# Patient Record
Sex: MALE | Race: WHITE | NOT HISPANIC OR LATINO | Employment: OTHER | URBAN - METROPOLITAN AREA
[De-identification: names, ages, dates, MRNs, and addresses within clinical notes are randomized per-mention and may not be internally consistent; named-entity substitution may affect disease eponyms.]

---

## 2023-04-30 DIAGNOSIS — K21.9 GASTROESOPHAGEAL REFLUX DISEASE WITHOUT ESOPHAGITIS: Primary | ICD-10-CM

## 2023-05-01 RX ORDER — PANTOPRAZOLE SODIUM 40 MG/1
TABLET, DELAYED RELEASE ORAL
Qty: 90 TABLET | Refills: 0 | Status: SHIPPED | OUTPATIENT
Start: 2023-05-01 | End: 2023-08-09

## 2023-08-09 DIAGNOSIS — K21.9 GASTROESOPHAGEAL REFLUX DISEASE WITHOUT ESOPHAGITIS: ICD-10-CM

## 2023-08-09 DIAGNOSIS — N52.9 ERECTILE DYSFUNCTION, UNSPECIFIED ERECTILE DYSFUNCTION TYPE: ICD-10-CM

## 2023-08-09 RX ORDER — SILDENAFIL 100 MG/1
TABLET, FILM COATED ORAL
Qty: 8 TABLET | Refills: 2 | Status: SHIPPED | OUTPATIENT
Start: 2023-08-09 | End: 2024-01-25

## 2023-08-09 RX ORDER — PANTOPRAZOLE SODIUM 40 MG/1
TABLET, DELAYED RELEASE ORAL
Qty: 90 TABLET | Refills: 1 | Status: SHIPPED | OUTPATIENT
Start: 2023-08-09 | End: 2024-02-29 | Stop reason: SDUPTHER

## 2023-10-06 ENCOUNTER — OFFICE VISIT (OUTPATIENT)
Dept: PRIMARY CARE | Facility: CLINIC | Age: 73
End: 2023-10-06
Payer: MEDICARE

## 2023-10-06 VITALS
BODY MASS INDEX: 27.3 KG/M2 | TEMPERATURE: 98.3 F | SYSTOLIC BLOOD PRESSURE: 122 MMHG | WEIGHT: 201.6 LBS | HEIGHT: 72 IN | OXYGEN SATURATION: 96 % | RESPIRATION RATE: 16 BRPM | HEART RATE: 66 BPM | DIASTOLIC BLOOD PRESSURE: 80 MMHG

## 2023-10-06 DIAGNOSIS — L03.311 CELLULITIS OF ABDOMINAL WALL: Primary | ICD-10-CM

## 2023-10-06 PROCEDURE — 96372 THER/PROPH/DIAG INJ SC/IM: CPT | Performed by: FAMILY MEDICINE

## 2023-10-06 PROCEDURE — 99213 OFFICE O/P EST LOW 20 MIN: CPT | Performed by: FAMILY MEDICINE

## 2023-10-06 PROCEDURE — 1159F MED LIST DOCD IN RCRD: CPT | Performed by: FAMILY MEDICINE

## 2023-10-06 RX ORDER — PROPAFENONE HYDROCHLORIDE 225 MG/1
225 TABLET, COATED ORAL EVERY 8 HOURS
COMMUNITY
End: 2024-01-23 | Stop reason: ALTCHOICE

## 2023-10-06 RX ORDER — LANOLIN ALCOHOL/MO/W.PET/CERES
1000 CREAM (GRAM) TOPICAL DAILY
COMMUNITY

## 2023-10-06 RX ORDER — DABIGATRAN ETEXILATE 150 MG/1
150 CAPSULE ORAL 2 TIMES DAILY
COMMUNITY
Start: 2019-03-13 | End: 2024-05-20 | Stop reason: SDUPTHER

## 2023-10-06 RX ORDER — SULFAMETHOXAZOLE AND TRIMETHOPRIM 800; 160 MG/1; MG/1
1 TABLET ORAL 2 TIMES DAILY
Qty: 20 TABLET | Refills: 1 | Status: SHIPPED | OUTPATIENT
Start: 2023-10-06 | End: 2023-10-16

## 2023-10-06 RX ORDER — FUROSEMIDE 20 MG/1
20 TABLET ORAL 3 TIMES WEEKLY
COMMUNITY
Start: 2019-09-04

## 2023-10-06 RX ORDER — METOPROLOL SUCCINATE 25 MG/1
50 TABLET, EXTENDED RELEASE ORAL NIGHTLY
COMMUNITY
Start: 2019-09-05 | End: 2024-01-23 | Stop reason: DRUGHIGH

## 2023-10-06 RX ORDER — LISINOPRIL 10 MG/1
10 TABLET ORAL DAILY
COMMUNITY
Start: 2019-02-05 | End: 2024-02-29 | Stop reason: SDUPTHER

## 2023-10-06 RX ORDER — CEFTRIAXONE 1 G/1
1 INJECTION, POWDER, FOR SOLUTION INTRAMUSCULAR; INTRAVENOUS ONCE
Status: COMPLETED | OUTPATIENT
Start: 2023-10-06 | End: 2023-10-06

## 2023-10-06 RX ORDER — CEFTRIAXONE 1 G/1
1 INJECTION, POWDER, FOR SOLUTION INTRAMUSCULAR; INTRAVENOUS ONCE
Status: DISCONTINUED | OUTPATIENT
Start: 2023-10-06 | End: 2023-10-06

## 2023-10-06 RX ORDER — POTASSIUM CHLORIDE 20 MEQ/1
20 TABLET, EXTENDED RELEASE ORAL DAILY
COMMUNITY
End: 2024-01-23 | Stop reason: ALTCHOICE

## 2023-10-06 RX ADMIN — CEFTRIAXONE 1 G: 1 INJECTION, POWDER, FOR SOLUTION INTRAMUSCULAR; INTRAVENOUS at 10:38

## 2023-10-06 NOTE — PROGRESS NOTES
Subjective   Patient ID: Cristiano Silva is a 73 y.o. male who presents for Fever and Mass.  HPI    Pt is being seen for fever   Ongoing for last 2 day  Pt states tested negative for Covid  Pt states that he temp has been in 100  Pt is not taking any OTC medication for his fever    Pt also has lump in his groin area  Pt states that lump is located on the right side   Pt states that it is very sore and tender  Pt states that the lump pop up 2 days ago      No other concern    Review of Systems  Constitutional:  no chills, no fever and no night sweats.  Eyes: no blurred vision and no eyesight problems.  ENT: no hearing loss, no nasal congestion, no hoarseness and no sore throat.  Neck: no mass (es) and no swelling.  Cardiovascular: no chest pain, no intermittent leg claudication, no lower extremity edema, no palpitation and no syncope.  Respiratory: no cough, no shortness of breath during exertion, no shortness of breath at rest and no wheezing.  Gastrointestinal: no abdominal pain, no blood in stools, no constipation, no diarrhea, no melena, no nausea, no rectal pain and no vomiting.  Genitourinary: no dysuria, no change in urinary frequency, no urinary hesitancy and no feelings of urinary urgency.  Musculoskeletal: no arthralgias, no back pain and no myalgias.  Integumentary: no new skin lesions and no rashes.  Neurological: no difficulty walking, no headache, no limb weakness, no numbness and no tingling.  Psychiatric/Behavioral: no anxiety, no depression, no anhedonia and no substance use disorders.  Endocrine: no recent weight gain and no recent weight loss.  Hematologic/Lymphatic: no tendency for easy bruising and no swollen glands    Objective   Physical Exam  Patient in with complaints of pain right lower abdominal groin area fever patient states he had a pimple he squeezed on swollen and inflamed exam shows indurated red nonfluctuant area with an eschar to the surface of the skin minimal inguinal adenopathy on  the right side.  No abdominal pain currently no fever.  Physical exam today's office visit constitutional alert and oriented x3.    Head is atraumatic HEENT is within normal limits.    Neck supple no masses full range of motion.    Thyroid is normal in size no thyromegaly there is no carotid bruits.    Pulmonary exam shows clear to auscultation no respiratory distress.    Cardiovascular shows no murmur rub or gallop.  Regular rate and rhythm.    Abdominal exam soft nontender no hepatosplenomegaly or masses normal bowel sounds no rebound no guarding.    Musculoskeletal exam no joint pain no muscle pain full range of motion.    Psych exam normal mood and affect.    Dermatologic exam no skin lesions no rash no blemishes.    Neuro exam is no focal deficits.  Normal exam.    Extremities no edema normal pulses normal capillary refill.    There were no vitals taken for this visit.    Lab Results   Component Value Date    WBC 7.4 12/15/2022    HGB 16.2 12/15/2022    HCT 49.7 12/15/2022    MCV 96 12/15/2022     12/15/2022       Assessment/Plan plan is to give him a gram Rocephin and start Bactrim DS twice daily for 10 days have him follow-up next week wanted to see him in 3 days he says he could not come back he is got a camping area that he has to close to and a couple golf cart advised that if he is worsening he needs to go to the emergency room otherwise follow-up with us either his PCP or myself in a week.  If this is worsening he is to let us know also recommend sitz bath's.  He is not to poke or squeeze at this area is advised it may come to ahead and drain we will reevaluate that when he comes in.  Problem List Items Addressed This Visit    None

## 2023-11-10 PROBLEM — I50.20 HFREF (HEART FAILURE WITH REDUCED EJECTION FRACTION) (MULTI): Status: ACTIVE | Noted: 2021-08-30

## 2023-11-10 PROBLEM — I48.91 ATRIAL FIBRILLATION (MULTI): Status: ACTIVE | Noted: 2023-11-10

## 2023-11-10 PROBLEM — R39.12 BENIGN PROSTATIC HYPERPLASIA WITH WEAK URINARY STREAM: Status: ACTIVE | Noted: 2023-11-10

## 2023-11-10 PROBLEM — F17.200 NICOTINE DEPENDENCE: Status: ACTIVE | Noted: 2021-08-30

## 2023-11-10 PROBLEM — R97.20 HIGH PROSTATE SPECIFIC ANTIGEN (PSA): Status: ACTIVE | Noted: 2018-03-27

## 2023-11-10 PROBLEM — E55.9 VITAMIN D DEFICIENCY: Status: ACTIVE | Noted: 2023-11-10

## 2023-11-10 PROBLEM — N32.89 BLADDER WALL THICKENING: Status: ACTIVE | Noted: 2023-11-10

## 2023-11-10 PROBLEM — Z86.79: Status: ACTIVE | Noted: 2023-11-10

## 2023-11-10 PROBLEM — I11.9 HYPERTENSIVE HEART DISEASE: Status: ACTIVE | Noted: 2023-11-10

## 2023-11-10 PROBLEM — N40.1 BENIGN PROSTATIC HYPERPLASIA WITH WEAK URINARY STREAM: Status: ACTIVE | Noted: 2023-11-10

## 2023-11-10 PROBLEM — R73.03 PREDIABETES: Status: ACTIVE | Noted: 2021-05-20

## 2023-11-10 PROBLEM — M19.019 PRIMARY LOCALIZED OSTEOARTHROSIS OF SHOULDER REGION: Status: ACTIVE | Noted: 2020-02-17

## 2023-11-10 PROBLEM — R13.10 DYSPHAGIA: Status: ACTIVE | Noted: 2022-04-13

## 2023-11-10 PROBLEM — I50.42 CHRONIC COMBINED SYSTOLIC AND DIASTOLIC CHF (CONGESTIVE HEART FAILURE) (MULTI): Status: ACTIVE | Noted: 2023-11-10

## 2023-11-10 PROBLEM — I10 ESSENTIAL HYPERTENSION: Status: ACTIVE | Noted: 2017-04-12

## 2023-11-10 PROBLEM — R53.83 FATIGUE: Status: ACTIVE | Noted: 2023-11-10

## 2023-11-10 RX ORDER — CHOLECALCIFEROL (VITAMIN D3) 50 MCG
1 TABLET ORAL DAILY
COMMUNITY
End: 2024-05-20 | Stop reason: ALTCHOICE

## 2023-11-10 NOTE — PROGRESS NOTES
Subjective   Patient ID: Cristiano Silva is a 73 y.o. male who presents for Hypertension.  HPI    HTN follow up  Denies chest pain,SOB, swelling, headaches, lightheadedness or dizziness.   Eats a generally healthy diet, Exercises.   Does not check BP at home.   Currently taking lisinopril and metoprolol     Taking current medications which were reviewed.  Problem list discussed.    Overall doing well.  Eating okay.  Staying active.    Has no other new problem /question.     ROS  Constitutional- No activity change. No appetite change.  Eyes- Denies vision changes.  Respiratory- No shortness of breath.  Cardiovascular- No palpitations. No chest pain.  GI- No nausea or vomiting. No diarrhea or constipation. Denies abdominal pain.  Musculoskeletal- Denies joint swelling.  Extremities- No edema.  Neurological- Denies headaches. Denies dizziness.  Skin- No rashes.  Psychiatric/Behavioral- Denies significant anxiety, or depressed mood.     Objective     /86   Pulse 98   Temp 36.5 °C (97.7 °F) (Temporal)   Resp 18   Ht 1.829 m (6')   Wt 93 kg (205 lb)   SpO2 98%   BMI 27.80 kg/m²     Allergies   Allergen Reactions    Statins-Hmg-Coa Reductase Inhibitors Other and Myalgia       Constitutional-- Well-nourished.  No distress  Head- unremarkable.  Eyes- PERRL.  Conjunctiva normal.  Nose- Normal.  No rhinorrhea noted.  Throat- Oropharynx is clear and moist.  Neck- Supple with no thyromegaly.  No significant cervical adenopathy noted.  Pulmonary/Chest- Breath sounds normal with normal effort.  No wheezing.  Heart- Regular rate and rhythm.  No murmur.  Abdomen- Soft and non-tender.  No masses noted.  Musculoskeletal- Normal ROM.  No significant joint swelling  Extremities- No edema.   Neurological- Alert.  No noted deficits.  Skin- Warm.  No rashes.  Psychiatric/Behavioral- Mood and affect normal.  Behavior normal.     Assessment/Plan   1. Hyperlipidemia, unspecified hyperlipidemia type  Lipid Panel      2. Essential  hypertension  Comprehensive Metabolic Panel    CBC and Auto Differential      3. Vitamin D deficiency        4. Gastroesophageal reflux disease without esophagitis        5. Primary localized osteoarthrosis of shoulder region, unspecified laterality        6. Colon cancer screening  Colonoscopy Screening; Average Risk Patient      7. Screening PSA (prostate specific antigen)  Prostate Specific Antigen, Screen      8. Atherosclerosis of aorta (CMS/HCC)        9. Centrilobular emphysema (CMS/HCC)        10. HFrEF (heart failure with reduced ejection fraction) (CMS/HCC)        11. Longstanding persistent atrial fibrillation (CMS/HCC)        12. Sarcoma (CMS/HCC)        13. Benign prostatic hyperplasia with urinary hesitancy  tamsulosin (Flomax) 0.4 mg 24 hr capsule             Long talk. Treatment options reviewed.    Reviewed most recent labs with patient.  Advised patient to remain up to date on routine screening and health maintenance.      Hypertension controlled.     Educated on acid reflux, heart burn and prevention.  Controlled  BPH symptoms persist.  Start Flomax again.  Recommend urology follow-up as he is seen in the past  Sarcoma stable.  Continue to follow-up with your specialist      Continue and take your medications as prescribed.    Health Maintenance issues discussed.    Importance of healthy diet and regular exercise regimen discussed.    We will contact you with any test results ordered. If you do not hear from us, please contact.    Follow-up as instructed or sooner if any problems or symptoms do not resolve as expected.    Scribe Attestation  By signing my name below, Kyara ROSSI Scribe   attest that this documentation has been prepared under the direction and in the presence of Tez Bates MD.

## 2023-11-13 ENCOUNTER — OFFICE VISIT (OUTPATIENT)
Dept: PRIMARY CARE | Facility: CLINIC | Age: 73
End: 2023-11-13
Payer: MEDICARE

## 2023-11-13 VITALS
WEIGHT: 205 LBS | TEMPERATURE: 97.7 F | SYSTOLIC BLOOD PRESSURE: 130 MMHG | HEIGHT: 72 IN | OXYGEN SATURATION: 98 % | BODY MASS INDEX: 27.77 KG/M2 | DIASTOLIC BLOOD PRESSURE: 86 MMHG | RESPIRATION RATE: 18 BRPM | HEART RATE: 98 BPM

## 2023-11-13 DIAGNOSIS — C49.9 SARCOMA (MULTI): ICD-10-CM

## 2023-11-13 DIAGNOSIS — I48.11 LONGSTANDING PERSISTENT ATRIAL FIBRILLATION (MULTI): ICD-10-CM

## 2023-11-13 DIAGNOSIS — N40.1 BENIGN PROSTATIC HYPERPLASIA WITH URINARY HESITANCY: ICD-10-CM

## 2023-11-13 DIAGNOSIS — E55.9 VITAMIN D DEFICIENCY: ICD-10-CM

## 2023-11-13 DIAGNOSIS — I10 ESSENTIAL HYPERTENSION: ICD-10-CM

## 2023-11-13 DIAGNOSIS — I70.0 ATHEROSCLEROSIS OF AORTA (CMS-HCC): ICD-10-CM

## 2023-11-13 DIAGNOSIS — E78.5 HYPERLIPIDEMIA, UNSPECIFIED HYPERLIPIDEMIA TYPE: Primary | ICD-10-CM

## 2023-11-13 DIAGNOSIS — Z12.11 COLON CANCER SCREENING: ICD-10-CM

## 2023-11-13 DIAGNOSIS — Z12.5 SCREENING PSA (PROSTATE SPECIFIC ANTIGEN): ICD-10-CM

## 2023-11-13 DIAGNOSIS — K21.9 GASTROESOPHAGEAL REFLUX DISEASE WITHOUT ESOPHAGITIS: ICD-10-CM

## 2023-11-13 DIAGNOSIS — J43.2 CENTRILOBULAR EMPHYSEMA (MULTI): ICD-10-CM

## 2023-11-13 DIAGNOSIS — M19.019 PRIMARY LOCALIZED OSTEOARTHROSIS OF SHOULDER REGION, UNSPECIFIED LATERALITY: ICD-10-CM

## 2023-11-13 DIAGNOSIS — I50.20 HFREF (HEART FAILURE WITH REDUCED EJECTION FRACTION) (MULTI): ICD-10-CM

## 2023-11-13 DIAGNOSIS — R39.11 BENIGN PROSTATIC HYPERPLASIA WITH URINARY HESITANCY: ICD-10-CM

## 2023-11-13 PROCEDURE — 3079F DIAST BP 80-89 MM HG: CPT | Performed by: FAMILY MEDICINE

## 2023-11-13 PROCEDURE — 99214 OFFICE O/P EST MOD 30 MIN: CPT | Performed by: FAMILY MEDICINE

## 2023-11-13 PROCEDURE — 3075F SYST BP GE 130 - 139MM HG: CPT | Performed by: FAMILY MEDICINE

## 2023-11-13 PROCEDURE — 1159F MED LIST DOCD IN RCRD: CPT | Performed by: FAMILY MEDICINE

## 2023-11-13 PROCEDURE — 1160F RVW MEDS BY RX/DR IN RCRD: CPT | Performed by: FAMILY MEDICINE

## 2023-11-13 RX ORDER — TAMSULOSIN HYDROCHLORIDE 0.4 MG/1
0.4 CAPSULE ORAL DAILY
Qty: 30 CAPSULE | Refills: 3 | Status: SHIPPED | OUTPATIENT
Start: 2023-11-13 | End: 2024-01-23 | Stop reason: ALTCHOICE

## 2023-11-13 ASSESSMENT — PATIENT HEALTH QUESTIONNAIRE - PHQ9
2. FEELING DOWN, DEPRESSED OR HOPELESS: NOT AT ALL
SUM OF ALL RESPONSES TO PHQ9 QUESTIONS 1 AND 2: 0
1. LITTLE INTEREST OR PLEASURE IN DOING THINGS: NOT AT ALL

## 2023-11-20 ENCOUNTER — LAB (OUTPATIENT)
Dept: LAB | Facility: LAB | Age: 73
End: 2023-11-20
Payer: MEDICARE

## 2023-11-20 DIAGNOSIS — Z12.5 SCREENING PSA (PROSTATE SPECIFIC ANTIGEN): ICD-10-CM

## 2023-11-20 DIAGNOSIS — E78.5 HYPERLIPIDEMIA, UNSPECIFIED HYPERLIPIDEMIA TYPE: ICD-10-CM

## 2023-11-20 DIAGNOSIS — I10 ESSENTIAL HYPERTENSION: ICD-10-CM

## 2023-11-20 LAB
ALBUMIN SERPL BCP-MCNC: 4.1 G/DL (ref 3.4–5)
ALP SERPL-CCNC: 39 U/L (ref 33–136)
ALT SERPL W P-5'-P-CCNC: 12 U/L (ref 10–52)
ANION GAP SERPL CALC-SCNC: 11 MMOL/L (ref 10–20)
AST SERPL W P-5'-P-CCNC: 15 U/L (ref 9–39)
BASOPHILS # BLD AUTO: 0.1 X10*3/UL (ref 0–0.1)
BASOPHILS NFR BLD AUTO: 1.3 %
BILIRUB SERPL-MCNC: 0.8 MG/DL (ref 0–1.2)
BUN SERPL-MCNC: 18 MG/DL (ref 6–23)
CALCIUM SERPL-MCNC: 9 MG/DL (ref 8.6–10.3)
CHLORIDE SERPL-SCNC: 102 MMOL/L (ref 98–107)
CHOLEST SERPL-MCNC: 177 MG/DL (ref 0–199)
CHOLESTEROL/HDL RATIO: 4.8
CO2 SERPL-SCNC: 29 MMOL/L (ref 21–32)
CREAT SERPL-MCNC: 1.43 MG/DL (ref 0.5–1.3)
EOSINOPHIL # BLD AUTO: 0.2 X10*3/UL (ref 0–0.4)
EOSINOPHIL NFR BLD AUTO: 2.6 %
ERYTHROCYTE [DISTWIDTH] IN BLOOD BY AUTOMATED COUNT: 12.4 % (ref 11.5–14.5)
GFR SERPL CREATININE-BSD FRML MDRD: 52 ML/MIN/1.73M*2
GLUCOSE SERPL-MCNC: 92 MG/DL (ref 74–99)
HCT VFR BLD AUTO: 48.2 % (ref 41–52)
HDLC SERPL-MCNC: 37.1 MG/DL
HGB BLD-MCNC: 15.9 G/DL (ref 13.5–17.5)
IMM GRANULOCYTES # BLD AUTO: 0.04 X10*3/UL (ref 0–0.5)
IMM GRANULOCYTES NFR BLD AUTO: 0.5 % (ref 0–0.9)
LDLC SERPL CALC-MCNC: 123 MG/DL
LYMPHOCYTES # BLD AUTO: 3.16 X10*3/UL (ref 0.8–3)
LYMPHOCYTES NFR BLD AUTO: 41.3 %
MCH RBC QN AUTO: 30.9 PG (ref 26–34)
MCHC RBC AUTO-ENTMCNC: 33 G/DL (ref 32–36)
MCV RBC AUTO: 94 FL (ref 80–100)
MONOCYTES # BLD AUTO: 0.5 X10*3/UL (ref 0.05–0.8)
MONOCYTES NFR BLD AUTO: 6.5 %
NEUTROPHILS # BLD AUTO: 3.65 X10*3/UL (ref 1.6–5.5)
NEUTROPHILS NFR BLD AUTO: 47.8 %
NON HDL CHOLESTEROL: 140 MG/DL (ref 0–149)
NRBC BLD-RTO: 0 /100 WBCS (ref 0–0)
PLATELET # BLD AUTO: 202 X10*3/UL (ref 150–450)
POTASSIUM SERPL-SCNC: 4.7 MMOL/L (ref 3.5–5.3)
PROT SERPL-MCNC: 7 G/DL (ref 6.4–8.2)
PSA SERPL-MCNC: 5.39 NG/ML
RBC # BLD AUTO: 5.14 X10*6/UL (ref 4.5–5.9)
SODIUM SERPL-SCNC: 137 MMOL/L (ref 136–145)
TRIGL SERPL-MCNC: 83 MG/DL (ref 0–149)
VLDL: 17 MG/DL (ref 0–40)
WBC # BLD AUTO: 7.7 X10*3/UL (ref 4.4–11.3)

## 2023-11-20 PROCEDURE — G0103 PSA SCREENING: HCPCS

## 2023-11-20 PROCEDURE — 36415 COLL VENOUS BLD VENIPUNCTURE: CPT

## 2023-11-20 PROCEDURE — 85025 COMPLETE CBC W/AUTO DIFF WBC: CPT

## 2023-11-20 PROCEDURE — 80053 COMPREHEN METABOLIC PANEL: CPT

## 2023-11-20 PROCEDURE — 80061 LIPID PANEL: CPT

## 2023-12-15 ENCOUNTER — TELEPHONE (OUTPATIENT)
Dept: PRIMARY CARE | Facility: CLINIC | Age: 73
End: 2023-12-15
Payer: MEDICARE

## 2023-12-15 NOTE — TELEPHONE ENCOUNTER
Patient is calling because he has a colonoscopy order from you but he isn't able to get a ride. He wanted to know if he could have the cologuard done instead. Ok for cologuard order? He states he's not having any issues so he thinks that the cologuard would be ok?  Please advise  Thanks      Patient's # [phone]

## 2023-12-15 NOTE — TELEPHONE ENCOUNTER
Pt aware. States he may be able to find a ride in January. He will call to set this up and give us a call back with an update.

## 2023-12-26 ENCOUNTER — TELEPHONE (OUTPATIENT)
Dept: GASTROENTEROLOGY | Facility: CLINIC | Age: 73
End: 2023-12-26
Payer: MEDICARE

## 2023-12-26 NOTE — TELEPHONE ENCOUNTER
Spoke with pt who tested positive for Covid 12/23. Was calling pt about cardiac clearance which was not dicussed. Due to Covid procedure is canceled and he will call back due to transportation.

## 2023-12-28 ENCOUNTER — TELEPHONE (OUTPATIENT)
Dept: GASTROENTEROLOGY | Facility: CLINIC | Age: 73
End: 2023-12-28
Payer: MEDICARE

## 2023-12-28 NOTE — TELEPHONE ENCOUNTER
Spoke with pt about rescheduling coloscopy due to him being covid postive. He will reach out when he speaks with family about having a ride to procedure.

## 2024-01-09 DIAGNOSIS — Z12.11 COLON CANCER SCREENING: ICD-10-CM

## 2024-01-09 RX ORDER — SODIUM, POTASSIUM,MAG SULFATES 17.5-3.13G
SOLUTION, RECONSTITUTED, ORAL ORAL
Qty: 2 EACH | Refills: 0 | Status: SHIPPED | OUTPATIENT
Start: 2024-01-09 | End: 2024-05-20 | Stop reason: ALTCHOICE

## 2024-01-10 ENCOUNTER — APPOINTMENT (OUTPATIENT)
Dept: GASTROENTEROLOGY | Facility: HOSPITAL | Age: 74
End: 2024-01-10
Payer: MEDICARE

## 2024-01-23 ENCOUNTER — ANESTHESIA (OUTPATIENT)
Dept: GASTROENTEROLOGY | Facility: HOSPITAL | Age: 74
End: 2024-01-23
Payer: MEDICARE

## 2024-01-23 ENCOUNTER — ANESTHESIA EVENT (OUTPATIENT)
Dept: GASTROENTEROLOGY | Facility: HOSPITAL | Age: 74
End: 2024-01-23
Payer: MEDICARE

## 2024-01-23 ENCOUNTER — HOSPITAL ENCOUNTER (OUTPATIENT)
Dept: GASTROENTEROLOGY | Facility: HOSPITAL | Age: 74
Setting detail: OUTPATIENT SURGERY
Discharge: HOME | End: 2024-01-23
Payer: MEDICARE

## 2024-01-23 VITALS
SYSTOLIC BLOOD PRESSURE: 142 MMHG | DIASTOLIC BLOOD PRESSURE: 68 MMHG | WEIGHT: 203 LBS | TEMPERATURE: 97.7 F | BODY MASS INDEX: 27.5 KG/M2 | OXYGEN SATURATION: 98 % | HEIGHT: 72 IN | RESPIRATION RATE: 16 BRPM | HEART RATE: 86 BPM

## 2024-01-23 DIAGNOSIS — Z12.11 COLON CANCER SCREENING: Primary | ICD-10-CM

## 2024-01-23 PROCEDURE — 88305 TISSUE EXAM BY PATHOLOGIST: CPT | Mod: TC,SUR,STJLAB | Performed by: INTERNAL MEDICINE

## 2024-01-23 PROCEDURE — 7100000009 HC PHASE TWO TIME - INITIAL BASE CHARGE

## 2024-01-23 PROCEDURE — 2500000004 HC RX 250 GENERAL PHARMACY W/ HCPCS (ALT 636 FOR OP/ED): Performed by: INTERNAL MEDICINE

## 2024-01-23 PROCEDURE — A45378 PR COLONOSCOPY,DIAGNOSTIC: Performed by: ANESTHESIOLOGY

## 2024-01-23 PROCEDURE — A45378 PR COLONOSCOPY,DIAGNOSTIC: Performed by: NURSE ANESTHETIST, CERTIFIED REGISTERED

## 2024-01-23 PROCEDURE — 7100000010 HC PHASE TWO TIME - EACH INCREMENTAL 1 MINUTE

## 2024-01-23 PROCEDURE — 2500000005 HC RX 250 GENERAL PHARMACY W/O HCPCS: Performed by: NURSE ANESTHETIST, CERTIFIED REGISTERED

## 2024-01-23 PROCEDURE — 88305 TISSUE EXAM BY PATHOLOGIST: CPT | Performed by: PATHOLOGY

## 2024-01-23 PROCEDURE — 99100 ANES PT EXTEME AGE<1 YR&>70: CPT | Performed by: ANESTHESIOLOGY

## 2024-01-23 PROCEDURE — 45380 COLONOSCOPY AND BIOPSY: CPT | Performed by: INTERNAL MEDICINE

## 2024-01-23 PROCEDURE — 2500000004 HC RX 250 GENERAL PHARMACY W/ HCPCS (ALT 636 FOR OP/ED): Performed by: NURSE ANESTHETIST, CERTIFIED REGISTERED

## 2024-01-23 PROCEDURE — 3700000002 HC GENERAL ANESTHESIA TIME - EACH INCREMENTAL 1 MINUTE

## 2024-01-23 PROCEDURE — 3700000001 HC GENERAL ANESTHESIA TIME - INITIAL BASE CHARGE

## 2024-01-23 RX ORDER — METOPROLOL SUCCINATE 100 MG/1
150 TABLET, EXTENDED RELEASE ORAL DAILY
COMMUNITY
End: 2024-01-23 | Stop reason: DRUGHIGH

## 2024-01-23 RX ORDER — PROPOFOL 10 MG/ML
INJECTION, EMULSION INTRAVENOUS AS NEEDED
Status: DISCONTINUED | OUTPATIENT
Start: 2024-01-23 | End: 2024-01-23

## 2024-01-23 RX ORDER — SODIUM CHLORIDE, SODIUM LACTATE, POTASSIUM CHLORIDE, CALCIUM CHLORIDE 600; 310; 30; 20 MG/100ML; MG/100ML; MG/100ML; MG/100ML
20 INJECTION, SOLUTION INTRAVENOUS CONTINUOUS
Status: DISCONTINUED | OUTPATIENT
Start: 2024-01-23 | End: 2024-01-24 | Stop reason: HOSPADM

## 2024-01-23 RX ORDER — METOPROLOL SUCCINATE 50 MG/1
50 TABLET, EXTENDED RELEASE ORAL DAILY
COMMUNITY
End: 2024-04-22

## 2024-01-23 RX ORDER — LIDOCAINE HYDROCHLORIDE 20 MG/ML
INJECTION, SOLUTION EPIDURAL; INFILTRATION; INTRACAUDAL; PERINEURAL AS NEEDED
Status: DISCONTINUED | OUTPATIENT
Start: 2024-01-23 | End: 2024-01-23

## 2024-01-23 RX ORDER — METOPROLOL SUCCINATE 100 MG/1
100 TABLET, EXTENDED RELEASE ORAL NIGHTLY
COMMUNITY

## 2024-01-23 RX ORDER — METOPROLOL TARTRATE 50 MG/1
50 TABLET ORAL 2 TIMES DAILY
COMMUNITY
End: 2024-01-23 | Stop reason: DRUGHIGH

## 2024-01-23 RX ADMIN — PROPOFOL 50 MG: 10 INJECTION, EMULSION INTRAVENOUS at 13:53

## 2024-01-23 RX ADMIN — PROPOFOL 50 MG: 10 INJECTION, EMULSION INTRAVENOUS at 13:55

## 2024-01-23 RX ADMIN — PROPOFOL 50 MG: 10 INJECTION, EMULSION INTRAVENOUS at 14:05

## 2024-01-23 RX ADMIN — PROPOFOL 50 MG: 10 INJECTION, EMULSION INTRAVENOUS at 13:59

## 2024-01-23 RX ADMIN — PROPOFOL 50 MG: 10 INJECTION, EMULSION INTRAVENOUS at 13:57

## 2024-01-23 RX ADMIN — SODIUM CHLORIDE, POTASSIUM CHLORIDE, SODIUM LACTATE AND CALCIUM CHLORIDE: 600; 310; 30; 20 INJECTION, SOLUTION INTRAVENOUS at 13:48

## 2024-01-23 RX ADMIN — PROPOFOL 50 MG: 10 INJECTION, EMULSION INTRAVENOUS at 13:51

## 2024-01-23 RX ADMIN — LIDOCAINE HYDROCHLORIDE 100 MG: 20 INJECTION, SOLUTION EPIDURAL; INFILTRATION; INTRACAUDAL; PERINEURAL at 13:51

## 2024-01-23 RX ADMIN — PROPOFOL 50 MG: 10 INJECTION, EMULSION INTRAVENOUS at 14:03

## 2024-01-23 RX ADMIN — PROPOFOL 50 MG: 10 INJECTION, EMULSION INTRAVENOUS at 14:01

## 2024-01-23 SDOH — HEALTH STABILITY: MENTAL HEALTH: CURRENT SMOKER: 0

## 2024-01-23 ASSESSMENT — COLUMBIA-SUICIDE SEVERITY RATING SCALE - C-SSRS
6. HAVE YOU EVER DONE ANYTHING, STARTED TO DO ANYTHING, OR PREPARED TO DO ANYTHING TO END YOUR LIFE?: NO
1. IN THE PAST MONTH, HAVE YOU WISHED YOU WERE DEAD OR WISHED YOU COULD GO TO SLEEP AND NOT WAKE UP?: NO
2. HAVE YOU ACTUALLY HAD ANY THOUGHTS OF KILLING YOURSELF?: NO

## 2024-01-23 ASSESSMENT — ENCOUNTER SYMPTOMS
NAUSEA: 0
UNEXPECTED WEIGHT CHANGE: 0
BLOOD IN STOOL: 0
SHORTNESS OF BREATH: 0
ANAL BLEEDING: 0
CONSTIPATION: 0
ABDOMINAL DISTENTION: 0
VOMITING: 0
TROUBLE SWALLOWING: 0
CHILLS: 0
FEVER: 0
DIARRHEA: 0
ABDOMINAL PAIN: 0
COLOR CHANGE: 0
RECTAL PAIN: 0

## 2024-01-23 ASSESSMENT — PAIN - FUNCTIONAL ASSESSMENT
PAIN_FUNCTIONAL_ASSESSMENT: 0-10

## 2024-01-23 ASSESSMENT — PAIN SCALES - GENERAL
PAINLEVEL_OUTOF10: 0 - NO PAIN

## 2024-01-23 NOTE — H&P
History Of Present Illness  Cristiano Silva is a 73 y.o. male   Here for colonoscopy.   H/O polyps     Past Medical History  Past Medical History:   Diagnosis Date    Chronic systolic (congestive) heart failure (CMS/HCC) 02/25/2022    Chronic systolic congestive heart failure    Encounter for general adult medical examination without abnormal findings 06/06/2022    Medicare annual wellness visit, subsequent    Encounter for screening for malignant neoplasm of prostate 12/06/2021    Special screening for malignant neoplasm of prostate    Encounter for screening for malignant neoplasm of respiratory organs     Screening for lung cancer    Encounter for screening for other disorder 06/06/2022    Special screening for other conditions    Obesity, unspecified 11/05/2021    Obesity (BMI 30-39.9)    Personal history of malignant neoplasm of soft tissue 12/19/2019    History of malignant neoplasm of soft tissue    Personal history of other benign neoplasm 12/19/2019    History of pituitary adenoma    Personal history of other benign neoplasm     History of other benign neoplasm    Personal history of other diseases of the digestive system 12/19/2019    History of gastroesophageal reflux (GERD)     Surgical History  Past Surgical History:   Procedure Laterality Date    OTHER SURGICAL HISTORY  12/19/2019    Pulmonary vein isolation    OTHER SURGICAL HISTORY  02/04/2022    Cardiac cath His ablation    OTHER SURGICAL HISTORY  11/04/2021    Colonoscopy     Social History  He reports that he has been smoking cigarettes. He has never used smokeless tobacco. No history on file for alcohol use and drug use.    Family History  No family history on file.     Allergies  Allergies   Allergen Reactions    Statins-Hmg-Coa Reductase Inhibitors Other and Myalgia     Review of Systems   Constitutional:  Negative for chills, fever and unexpected weight change.   HENT:  Negative for trouble swallowing.    Respiratory:  Negative for shortness of  breath.    Cardiovascular:  Negative for chest pain.   Gastrointestinal:  Negative for abdominal distention, abdominal pain, anal bleeding, blood in stool, constipation, diarrhea, nausea, rectal pain and vomiting.   Skin:  Negative for color change.        Physical Exam  Vitals reviewed.   Constitutional:       General: He is awake.      Appearance: Normal appearance.   HENT:      Head: Normocephalic and atraumatic.      Nose: Nose normal.      Mouth/Throat:      Mouth: Mucous membranes are moist.   Eyes:      Pupils: Pupils are equal, round, and reactive to light.   Cardiovascular:      Rate and Rhythm: Normal rate.   Pulmonary:      Effort: Pulmonary effort is normal.   Neurological:      Mental Status: He is alert and oriented to person, place, and time. Mental status is at baseline.   Psychiatric:         Attention and Perception: Attention and perception normal.         Mood and Affect: Mood normal.         Behavior: Behavior normal.          Last Recorded Vitals  There were no vitals taken for this visit.    Assessment/Plan   Colonoscopy  Colon cancer screening  H/o polyps     Dex Winters MD

## 2024-01-23 NOTE — DISCHARGE INSTRUCTIONS
Patient Instructions after a Colonoscopy      The anesthetics, sedatives or narcotics which were given to you today will be acting in your body for the next 24 hours, so you might feel a little sleepy or groggy.  This feeling should slowly wear off. Carefully read and follow the instructions.     You received sedation today:  - Do not drive or operate any machinery or power tools of any kind.   - No alcoholic beverages today, not even beer or wine.  - Do not make any important decisions or sign any legal documents.  - No over the counter medications that contain alcohol or that may cause drowsiness.  - Do not make any important decisions or sign any legal documents.    While it is common to experience mild to moderate abdominal distention, gas, or belching after your procedure, if any of these symptoms occur following discharge from the GI Lab or within one week of having your procedure, call the [facility] to be advised whether a visit to your nearest Urgent Care or Emergency Department is indicated.  Take this paper with you if you go.     - If you develop an allergic reaction to the medications that were given during your procedure such as difficulty breathing, rash, hives, severe nausea, vomiting or lightheadedness.  - If you experience chest pain, shortness of breath, severe abdominal pain, fevers and chills.  -If you develop signs and symptoms of bleeding such as blood in your spit, if your stools turn black, tarry, or bloody  - If you have not urinated within 8 hours following your procedure.  - If your IV site becomes painful, red, inflamed, or looks infected.    If you received a biopsy/polypectomy/sphincterotomy the following instructions apply below:    __ Do not use Aspirin containing products, non-steroidal medications or anti-coagulants for one week following your procedure. (Examples of these types of medications are: Advil, Arthrotec, Aleve, Coumadin, Ecotrin, Heparin, Ibuprofen,  Indocin, Motrin, Naprosyn, Nuprin, Plavix, Vioxx, and Voltarin, or their generic forms.  This list is not all-inclusive.  Check with your physician or pharmacist before resuming medications.)   __ Eat a soft diet today.  Avoid foods that are poorly digested for the next 24 hours.  These foods would include: nuts, beans, lettuce, red meats, and fried foods. Start with liquids and advance your diet as tolerated, gradually work up to eating solids.   __ Do not have a Barium Study or Enema for one week.    Your physician recommends the additional following instructions:    -You have a contact number available for emergencies. The signs and symptoms of potential delayed complications were discussed with you. You may return to normal activities tomorrow.  -Resume your previous diet.  -Continue your present medications.   -We are waiting for your pathology results.  -Your physician has recommended a repeat colonoscopy (date to be determined after pending pathology results are reviewed) for surveillance based on pathology results.  -The findings and recommendations have been discussed with you.  -The findings and recommendations were discussed with your family.  - Please see Medication Reconciliation Form for new medication/medications prescribed.       If you experience any problems or have any questions following discharge from the GI Lab, please call:        Nurse Signature                                                                        Date___________________                                                                            Patient/Responsible Party Signature                                        Date___________________

## 2024-01-23 NOTE — ANESTHESIA PREPROCEDURE EVALUATION
Patient: Cristiano Silva    Procedure Information       Date/Time: 01/23/24 1320    Scheduled providers: Dex iWnters MD    Procedure: COLONOSCOPY    Location: [facility]            Relevant Problems   Cardiovascular   (+) Atrial fibrillation (CMS/HCC)   (+) Chronic combined systolic and diastolic CHF (congestive heart failure) (CMS/HCC)   (+) Essential hypertension   (+) HFrEF (heart failure with reduced ejection fraction) (CMS/HCC)   (+) Hyperlipidemia      GI   (+) Gastroesophageal reflux disease without esophagitis      Pulmonary   (+) Centrilobular emphysema (CMS/HCC)      Musculoskeletal   (+) Primary localized osteoarthrosis of shoulder region       Clinical information reviewed:   Tobacco  Allergies  Meds   Med Hx  Surg Hx   Fam Hx  Soc Hx        NPO Detail:  NPO/Void Status  Carbohydrate Drink Given Prior to Surgery? : N  Date of Last Liquid: 01/23/24  Time of Last Liquid: 0800  Date of Last Solid: 01/22/24  Time of Last Solid: 0800  Last Intake Type: Clear fluids  Time of Last Void: 1315         Physical Exam    Airway  Mallampati: II  TM distance: >3 FB  Neck ROM: full     Cardiovascular   Rhythm: regular  Rate: normal     Dental    Pulmonary   Breath sounds clear to auscultation     Abdominal            Anesthesia Plan    History of general anesthesia?: yes  History of complications of general anesthesia?: no    ASA 3     general     The patient is not a current smoker.    intravenous induction   Anesthetic plan and risks discussed with patient.    Plan discussed with CRNA.

## 2024-01-24 DIAGNOSIS — N52.9 ERECTILE DYSFUNCTION, UNSPECIFIED ERECTILE DYSFUNCTION TYPE: ICD-10-CM

## 2024-01-25 LAB
LABORATORY COMMENT REPORT: NORMAL
PATH REPORT.FINAL DX SPEC: NORMAL
PATH REPORT.GROSS SPEC: NORMAL
PATH REPORT.RELEVANT HX SPEC: NORMAL
PATH REPORT.TOTAL CANCER: NORMAL

## 2024-01-25 RX ORDER — SILDENAFIL 100 MG/1
TABLET, FILM COATED ORAL
Qty: 8 TABLET | Refills: 0 | Status: SHIPPED | OUTPATIENT
Start: 2024-01-25 | End: 2024-04-22

## 2024-02-01 ENCOUNTER — OFFICE VISIT (OUTPATIENT)
Dept: UROLOGY | Facility: CLINIC | Age: 74
End: 2024-02-01
Payer: MEDICARE

## 2024-02-01 VITALS
BODY MASS INDEX: 27.68 KG/M2 | SYSTOLIC BLOOD PRESSURE: 128 MMHG | WEIGHT: 204.4 LBS | HEART RATE: 76 BPM | DIASTOLIC BLOOD PRESSURE: 86 MMHG | HEIGHT: 72 IN | RESPIRATION RATE: 14 BRPM

## 2024-02-01 DIAGNOSIS — R97.20 HIGH PROSTATE SPECIFIC ANTIGEN (PSA): ICD-10-CM

## 2024-02-01 DIAGNOSIS — N40.1 BENIGN PROSTATIC HYPERPLASIA WITH WEAK URINARY STREAM: ICD-10-CM

## 2024-02-01 DIAGNOSIS — R39.12 BENIGN PROSTATIC HYPERPLASIA WITH WEAK URINARY STREAM: ICD-10-CM

## 2024-02-01 DIAGNOSIS — R97.20 ELEVATED PSA: ICD-10-CM

## 2024-02-01 DIAGNOSIS — N52.8 OTHER MALE ERECTILE DYSFUNCTION: Primary | ICD-10-CM

## 2024-02-01 PROCEDURE — 99214 OFFICE O/P EST MOD 30 MIN: CPT | Performed by: UROLOGY

## 2024-02-01 PROCEDURE — 3079F DIAST BP 80-89 MM HG: CPT | Performed by: UROLOGY

## 2024-02-01 PROCEDURE — 3074F SYST BP LT 130 MM HG: CPT | Performed by: UROLOGY

## 2024-02-01 PROCEDURE — 1160F RVW MEDS BY RX/DR IN RCRD: CPT | Performed by: UROLOGY

## 2024-02-01 PROCEDURE — 1126F AMNT PAIN NOTED NONE PRSNT: CPT | Performed by: UROLOGY

## 2024-02-01 PROCEDURE — 1159F MED LIST DOCD IN RCRD: CPT | Performed by: UROLOGY

## 2024-02-01 RX ORDER — TADALAFIL 20 MG/1
20 TABLET ORAL DAILY PRN
Qty: 30 TABLET | Refills: 5 | Status: SHIPPED | OUTPATIENT
Start: 2024-02-01 | End: 2025-01-31

## 2024-02-01 ASSESSMENT — PAIN SCALES - GENERAL: PAINLEVEL: 0-NO PAIN

## 2024-02-01 NOTE — PROGRESS NOTES
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 Hackett  PSA history:  04/2022 7.5  12/2021: 7.30  Prior bx at [facility] \"always elevated\"  Prior biopsy 2020 was negative  Regarding LUTS, most bothersome is weak stream, nocturnal frequency  Denies hematuria, frequency, urgency, hesitancy, straining, incomplete emptying  NTF 1  QOL 2/5  PVR 40 cc  Regarding ED: Uses Viagra 100 mg as needed - gets an erection sufficient for penetration, but durability is poor  Takes it an hour beforehand  Didn't know it had to be on an empty stomach   Plan 2022 - constriction band; MRI prostate, stop flomax    06/2022 - MRI-P - 76.1g prostate, PSAD 0.1; no concerning lesions  05/23 - PSA 5.39    UPDATED SUBJECTIVE HISTORY  02/01/24 - Pt reports ED (same as above), slow stream. Tried restarting tamsulosin without benefit.    Endorses: hesitancy, weak stream, straining.   Denies: Intermittency, hematuria, urge/freq/leakage  NTF 1x    Past Medical History  He has a past medical history of Chronic systolic (congestive) heart failure (CMS/HCC) (02/25/2022), Encounter for general adult medical examination without abnormal findings (06/06/2022), Encounter for screening for malignant neoplasm of prostate (12/06/2021), Encounter for screening for malignant neoplasm of respiratory organs, Encounter for screening for other disorder (06/06/2022), Obesity, unspecified (11/05/2021), Personal history of malignant neoplasm of soft tissue (12/19/2019), Personal history of other benign neoplasm (12/19/2019), Personal history of other benign neoplasm, and Personal history of other diseases " of the digestive system (12/19/2019).    Surgical History  He has a past surgical history that includes Other surgical history (12/19/2019); Other surgical history (02/04/2022); and Other surgical history (11/04/2021).     Social History  He reports that he has been smoking cigarettes. He has been smoking an average of 1 pack per day. He has never used smokeless tobacco. He reports that he does not currently use drugs. No history on file for alcohol use.    Family History  No family history on file.     Allergies  Statins-hmg-coa reductase inhibitors    ROS: 12 system review was completed and is negative with the exception of those signs and symptoms noted in the history of present illness: A 12 system review was completed and is negative with the exception of those signs and symptoms noted in the history of present illness.     Exam:  General: in NAD, appears stated age  Head: normocephalic, atraumatic  Respiratory: normal effort, no use of accessory muscles  Cardiovascular: no edema noted  Skin: normal turgor, no rashes  Neurologic: grossly intact, oriented to person/place/time  Psychiatric: mode and affect appropriate     Last Recorded Vitals  Blood pressure 128/86, pulse 76, resp. rate 14, height 1.829 m (6'), weight 92.7 kg (204 lb 6.4 oz).    Lab Results   Component Value Date    PSASCREEN 5.39 (H) 11/20/2023    CREATININE 1.43 (H) 11/20/2023    HGB 15.9 11/20/2023         ASSESSMENT/PLAN:  # Elevated PSA  -PSA is actually decreased from the last time I saw him 2 years ago  -MRI at that time was negative and his PSA density is very low  -I recommend against any additional testing, possible repeat PSA screening in 2 years    # Enlarged prostate with lower urinary tract symptoms  -After further questioning, patient seems like he is not bothered by his symptoms  -I reviewed with him that his prostate is very large and is likely the cause of blockage within his urinary tract  -I began discussing with him  finasteride and mention the side effects as well as surgical options  -Given that the patient is minimally bothered he would like to hold off any treatment at this point which I believe is reasonable    # Erectile dysfunction  -We discussed treatment options, including tadalafil 20 mg as needed, intracavernosal injection, inflatable penile prosthetic, vacuum erectile device  -Patient is not interested in ICI or IPP  -Switched to tadalafil, discussed appropriate use and side effects    Follow-up in 1 year    Yobani Roblero MD

## 2024-02-29 DIAGNOSIS — K21.9 GASTROESOPHAGEAL REFLUX DISEASE WITHOUT ESOPHAGITIS: ICD-10-CM

## 2024-02-29 DIAGNOSIS — I10 ESSENTIAL HYPERTENSION: ICD-10-CM

## 2024-02-29 RX ORDER — PANTOPRAZOLE SODIUM 40 MG/1
40 TABLET, DELAYED RELEASE ORAL DAILY
Qty: 90 TABLET | Refills: 0 | Status: SHIPPED | OUTPATIENT
Start: 2024-02-29 | End: 2024-05-28

## 2024-02-29 RX ORDER — LISINOPRIL 10 MG/1
10 TABLET ORAL DAILY
Qty: 90 TABLET | Refills: 3 | Status: SHIPPED | OUTPATIENT
Start: 2024-02-29

## 2024-02-29 NOTE — TELEPHONE ENCOUNTER
Rx Refill Request Telephone Encounter    Name:  Cristiano Silva  : 1950     Medication Name:  pantoprazole (ProtoNix) 40 mg EC tablet   Quantity (Optional):    90  Refill: 1  Directions (Optional):   TAKE ONE TABLET BY MOUTH EVERY DAY     Specific Pharmacy location:  [facility]    Date of last appointment:  23

## 2024-03-04 NOTE — RESULT ENCOUNTER NOTE
During recent colonoscopy a polyp was seen and completely removed.  Pathology results show this polyp as sessile serrated adenoma.  This kind of polyp is benign but precancerous.  Based on pathology results I recommend repeating colonoscopy in 7 years.  Do not hesitate to contact me if you have any questions or concerns.

## 2024-04-20 DIAGNOSIS — I50.42 CHRONIC COMBINED SYSTOLIC AND DIASTOLIC CHF (CONGESTIVE HEART FAILURE) (MULTI): Primary | ICD-10-CM

## 2024-04-20 DIAGNOSIS — N52.9 ERECTILE DYSFUNCTION, UNSPECIFIED ERECTILE DYSFUNCTION TYPE: ICD-10-CM

## 2024-04-22 RX ORDER — METOPROLOL SUCCINATE 50 MG/1
50 TABLET, EXTENDED RELEASE ORAL DAILY
Qty: 90 TABLET | Refills: 3 | Status: SHIPPED | OUTPATIENT
Start: 2024-04-22 | End: 2025-04-22

## 2024-04-22 RX ORDER — SILDENAFIL 100 MG/1
TABLET, FILM COATED ORAL
Qty: 8 TABLET | Refills: 0 | Status: SHIPPED | OUTPATIENT
Start: 2024-04-22

## 2024-04-24 PROBLEM — C49.9: Status: ACTIVE | Noted: 2017-01-04

## 2024-04-24 PROBLEM — Q24.8 PERICARDIAL CYST (HHS-HCC): Status: ACTIVE | Noted: 2024-04-24

## 2024-04-24 PROBLEM — F10.10 ALCOHOL ABUSE: Status: ACTIVE | Noted: 2024-04-24

## 2024-04-24 PROBLEM — E66.811 CLASS 1 OBESITY IN ADULT: Status: ACTIVE | Noted: 2019-08-28

## 2024-04-24 PROBLEM — M75.20 BICIPITAL TENOSYNOVITIS: Status: ACTIVE | Noted: 2020-02-17

## 2024-04-24 PROBLEM — E66.9 CLASS 1 OBESITY IN ADULT: Status: ACTIVE | Noted: 2019-08-28

## 2024-04-24 PROBLEM — Z85.9 HISTORY OF MALIGNANT NEOPLASM: Status: ACTIVE | Noted: 2024-04-24

## 2024-04-24 RX ORDER — SPIRONOLACTONE 25 MG/1
0.5 TABLET ORAL 3 TIMES WEEKLY
COMMUNITY

## 2024-04-24 RX ORDER — POTASSIUM CHLORIDE 20 MEQ/1
20 TABLET, EXTENDED RELEASE ORAL 3 TIMES DAILY PRN
COMMUNITY
Start: 2019-02-26

## 2024-05-20 ENCOUNTER — OFFICE VISIT (OUTPATIENT)
Dept: CARDIOLOGY | Facility: CLINIC | Age: 74
End: 2024-05-20
Payer: MEDICARE

## 2024-05-20 ENCOUNTER — TELEPHONE (OUTPATIENT)
Dept: CARDIOLOGY | Facility: CLINIC | Age: 74
End: 2024-05-20

## 2024-05-20 VITALS
WEIGHT: 201 LBS | HEIGHT: 72 IN | HEART RATE: 68 BPM | SYSTOLIC BLOOD PRESSURE: 128 MMHG | BODY MASS INDEX: 27.22 KG/M2 | DIASTOLIC BLOOD PRESSURE: 78 MMHG

## 2024-05-20 DIAGNOSIS — I42.6 ALCOHOLIC CARDIOMYOPATHY (MULTI): ICD-10-CM

## 2024-05-20 DIAGNOSIS — I48.20 CHRONIC ATRIAL FIBRILLATION (MULTI): ICD-10-CM

## 2024-05-20 DIAGNOSIS — I50.22 HEART FAILURE WITH MID-RANGE EJECTION FRACTION (HFMEF) (MULTI): Primary | ICD-10-CM

## 2024-05-20 DIAGNOSIS — F17.200 NICOTINE DEPENDENCE, UNCOMPLICATED, UNSPECIFIED NICOTINE PRODUCT TYPE: ICD-10-CM

## 2024-05-20 PROBLEM — F10.10 ALCOHOL ABUSE: Status: RESOLVED | Noted: 2024-04-24 | Resolved: 2024-05-20

## 2024-05-20 PROCEDURE — 1159F MED LIST DOCD IN RCRD: CPT | Performed by: INTERNAL MEDICINE

## 2024-05-20 PROCEDURE — 3078F DIAST BP <80 MM HG: CPT | Performed by: INTERNAL MEDICINE

## 2024-05-20 PROCEDURE — 1160F RVW MEDS BY RX/DR IN RCRD: CPT | Performed by: INTERNAL MEDICINE

## 2024-05-20 PROCEDURE — 99214 OFFICE O/P EST MOD 30 MIN: CPT | Performed by: INTERNAL MEDICINE

## 2024-05-20 PROCEDURE — 3074F SYST BP LT 130 MM HG: CPT | Performed by: INTERNAL MEDICINE

## 2024-05-20 PROCEDURE — 4004F PT TOBACCO SCREEN RCVD TLK: CPT | Performed by: INTERNAL MEDICINE

## 2024-05-20 RX ORDER — DABIGATRAN ETEXILATE 150 MG/1
150 CAPSULE ORAL 2 TIMES DAILY
Qty: 180 CAPSULE | Refills: 3 | Status: SHIPPED | OUTPATIENT
Start: 2024-05-20 | End: 2025-05-20

## 2024-05-20 NOTE — TELEPHONE ENCOUNTER
I rec'v a fax from [employer] requesting a Prior Auth for Pradaxa/dabigatran 150mg BID.    Since Cristiano has not tried and failed the Covered or Approved Formulary Alternatives, the Prior Auth has been Denied.    The Covered Formulary Alternatives are...     Brilinta,   Clopidogrel,  Dabigatran,  Eliquis,  Prasugrel,  Xarelto.    Dr. Padilla would you like to change to a Formulary Medication?    To [facility] for review.  ---ssd.

## 2024-05-20 NOTE — PROGRESS NOTES
Chief Complaint:   Please see below.     History Of Present Illness:    Cristiano Silva is a 74 y.o. male presenting with chronic heart failure with mid range ejection fraction, chronic atrial fibrillation.    This 74-year-old hypertensive man with a prior history of alcohol abuse and alcoholic cardiomyopathy has chronic atrial fibrillation managed with a strategy of rate control and anticoagulation. He has an ejection fraction of 40 to 45% by echocardiography done in April 2023. His most recent SPECT in August 2014 was negative for ischemia.     The patient denies chest discomfort, dyspnea, palpitations, orthopnea, PND, syncope, and near syncope.  He is still working 9 hours a day, delivering auto parts.  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  He still drinks 4 or 5 beers on weekind days.       Last Recorded Vitals:  Vitals:    05/20/24 1300   BP: 128/78   BP Location: Left arm   Patient Position: Sitting   Pulse: 68   Weight: 91.2 kg (201 lb)   Height: 1.829 m (6')       Past Medical History:  He has a past medical history of Chronic systolic (congestive) heart failure (Multi) (02/25/2022), Encounter for general adult medical examination without abnormal findings (06/06/2022), Encounter for screening for malignant neoplasm of prostate (12/06/2021), Encounter for screening for malignant neoplasm of respiratory organs, Encounter for screening for other disorder (06/06/2022), Obesity, unspecified (11/05/2021), Personal history of malignant neoplasm of soft tissue (12/19/2019), Personal history of other benign neoplasm (12/19/2019), Personal history of other benign neoplasm, and Personal history of other diseases of the digestive system (12/19/2019).    Past Surgical History:  He has a past surgical history that includes Other surgical history (12/19/2019); Other  surgical history (02/04/2022); and Other surgical history (11/04/2021).      Social History:  He reports that he has been smoking cigarettes. He started smoking about 61 years ago. He has a 15.3 pack-year smoking history. He has never used smokeless tobacco. He reports current alcohol use. He reports that he does not currently use drugs after having used the following drugs: Marijuana.    Family History:  Family History   Problem Relation Name Age of Onset    Heart failure Father          Allergies:  Statins-hmg-coa reductase inhibitors    Outpatient Medications:  Current Outpatient Medications   Medication Instructions    cyanocobalamin (VITAMIN B-12) 1,000 mcg, oral, Daily    dabigatran etexilate (PRADAXA) 150 mg, oral, 2 times daily    furosemide (LASIX) 20 mg, oral, 3 times weekly    lisinopril 10 mg, oral, Daily    metoprolol succinate XL (TOPROL-XL) 100 mg, oral, Nightly, Do not crush or chew.    metoprolol succinate XL (TOPROL-XL) 50 mg, oral, Daily    pantoprazole (PROTONIX) 40 mg, oral, Daily, Do not crush, chew, or split.    potassium chloride CR 20 mEq ER tablet 20 mEq, oral, 3 times daily PRN    sildenafil (Viagra) 100 mg tablet TAKE ONE TABLET BY MOUTH EVERY DAY AS NEEDED 1 HOUR BEFORE THE INTENDED SEX    spironolactone (Aldactone) 25 mg tablet 0.5 tablets, oral, 3 times weekly    tadalafil (CIALIS) 20 mg, oral, Daily PRN       Physical Exam:  GENERAL:  pleasant 74 year-old  HEENT: No xanthelasma  NECK: Supple, no palpable adenopathy or thyromegaly  CHEST: Clear to auscultation, respiratory effort unlabored  CARDIAC: RRR, normal S1 and S2, no audible murmur, rub, gallop, carotids are brisk, PMI is not displaced  ABD: Active bowel sounds, nontender, no organomegaly, no evidence of ascites  EXT: No clubbing, cyanosis, edema, or tenderness  NEURO: Awake, alert, appropriate, speech is fluent       Last Labs:  CBC -  Lab Results   Component Value Date    WBC 7.7 11/20/2023    HGB 15.9 11/20/2023    HCT 48.2  11/20/2023    MCV 94 11/20/2023     11/20/2023       CMP -  Lab Results   Component Value Date    CALCIUM 9.0 11/20/2023    PROT 7.0 11/20/2023    ALBUMIN 4.1 11/20/2023    AST 15 11/20/2023    ALT 12 11/20/2023    ALKPHOS 39 11/20/2023    BILITOT 0.8 11/20/2023       LIPID PANEL -   Lab Results   Component Value Date    CHOL 177 11/20/2023    TRIG 83 11/20/2023    HDL 37.1 11/20/2023    CHHDL 4.8 11/20/2023    LDLF 129 (H) 12/15/2022    VLDL 17 11/20/2023    NHDL 140 11/20/2023       RENAL FUNCTION PANEL -   Lab Results   Component Value Date    GLUCOSE 92 11/20/2023     11/20/2023    K 4.7 11/20/2023     11/20/2023    CO2 29 11/20/2023    ANIONGAP 11 11/20/2023    BUN 18 11/20/2023    CREATININE 1.43 (H) 11/20/2023    GFRMALE 64 12/15/2022    CALCIUM 9.0 11/20/2023    ALBUMIN 4.1 11/20/2023        Lab Results   Component Value Date    HGBA1C 5.7 (H) 05/17/2021         No recent results to review    Assessment/Plan   Problem List Items Addressed This Visit          Cardiac and Vasculature    Atrial fibrillation (Multi)    Relevant Medications    dabigatran etexilate (Pradaxa) 150 mg capsule    Other Relevant Orders    CBC    Comprehensive metabolic panel    Follow Up In Cardiology    Alcoholic cardiomyopathy (Multi)    Heart failure with mid-range ejection fraction (HFmEF) (Multi) - Primary    Relevant Orders    Follow Up In Cardiology       Tobacco    Nicotine dependence      The patient has stage B class 1 chronic  heart failure with mid range ejection fraction, and is stable on medical therapy.  Continue present medical regimen.  Discussed the importance of limiting sodium intake to 2000 mg daily, checking daily weights, and contacting us with weight gain in excess of 5 lbs in one week.  Chronic atrial fibrillation: stable on anticoagulation, with adequate rate control.  Continue present management.  I've reviewed the patient's recent labs.  Above all else, I advised the patient to quit  tobacco, or else risk certain future cardiovascular morbidity, and/or mortality.  We also discussed the potential adverse effects of alcohol in relation to his heart.      Rubin Padilla MD

## 2024-05-23 RX ORDER — DABIGATRAN ETEXILATE 150 MG/1
150 CAPSULE ORAL 2 TIMES DAILY
Qty: 180 CAPSULE | Refills: 3 | Status: CANCELLED | OUTPATIENT
Start: 2024-05-23 | End: 2025-05-23

## 2024-05-27 DIAGNOSIS — K21.9 GASTROESOPHAGEAL REFLUX DISEASE WITHOUT ESOPHAGITIS: ICD-10-CM

## 2024-05-28 RX ORDER — PANTOPRAZOLE SODIUM 40 MG/1
40 TABLET, DELAYED RELEASE ORAL DAILY
Qty: 90 TABLET | Refills: 0 | Status: SHIPPED | OUTPATIENT
Start: 2024-05-28

## 2024-05-28 NOTE — TELEPHONE ENCOUNTER
Last seen in November, please call and schedule appointment.  Can send in short supply if needed, just let us know.  Thanks ladies!

## 2024-05-29 ENCOUNTER — DOCUMENTATION (OUTPATIENT)
Dept: CARDIOLOGY | Facility: CLINIC | Age: 74
End: 2024-05-29
Payer: MEDICARE

## 2024-05-29 DIAGNOSIS — I48.20 CHRONIC ATRIAL FIBRILLATION (MULTI): Primary | ICD-10-CM

## 2024-05-29 NOTE — TELEPHONE ENCOUNTER
Patient called, he has not received the ordered dabigatran as of yet.    I called the [facility] to make sure they received our e-script.    Per [facility], on their end the only covered formulary alternatives are Eliquis and Xarelto. Patient must try and fail a covered med before Prior Auth for dabigatran be approved.  Both meds are expensive and patient can not afford expensive medications.  (We could try Patient Assistance?)    To Dr. Padilla for review.  ---ssd.

## 2024-05-29 NOTE — PROGRESS NOTES
I called to check on the patient.  He does not want to take Eliquis or Xarelto because of cost.  He does not want me to submit a request to the clinical pharmacy team to see whether he qualifies for Eliquis at reduced cost.  He does not want to go on warfarin because of the frequent blood tests necessitated.  He is willing to learn more about the Watchman procedure to obviate the need for long-term systemic anticoagulation.  I have ordered a referral to the structural heart team with Dr. Marsh to address this issue with the patient.

## 2024-06-24 DIAGNOSIS — I10 ESSENTIAL HYPERTENSION: ICD-10-CM

## 2024-06-24 DIAGNOSIS — N52.9 ERECTILE DYSFUNCTION, UNSPECIFIED ERECTILE DYSFUNCTION TYPE: ICD-10-CM

## 2024-06-24 RX ORDER — SILDENAFIL 100 MG/1
TABLET, FILM COATED ORAL
Qty: 8 TABLET | Refills: 0 | Status: SHIPPED | OUTPATIENT
Start: 2024-06-24

## 2024-06-24 RX ORDER — METOPROLOL SUCCINATE 100 MG/1
100 TABLET, EXTENDED RELEASE ORAL DAILY
Qty: 90 TABLET | Refills: 3 | Status: SHIPPED | OUTPATIENT
Start: 2024-06-24

## 2024-07-11 DIAGNOSIS — I10 ESSENTIAL HYPERTENSION: ICD-10-CM

## 2024-07-12 RX ORDER — METOPROLOL SUCCINATE 100 MG/1
100 TABLET, EXTENDED RELEASE ORAL DAILY
Qty: 90 TABLET | Refills: 3 | Status: SHIPPED | OUTPATIENT
Start: 2024-07-12

## 2024-08-22 DIAGNOSIS — K21.9 GASTROESOPHAGEAL REFLUX DISEASE WITHOUT ESOPHAGITIS: ICD-10-CM

## 2024-08-22 DIAGNOSIS — N52.9 ERECTILE DYSFUNCTION, UNSPECIFIED ERECTILE DYSFUNCTION TYPE: ICD-10-CM

## 2024-08-23 RX ORDER — PANTOPRAZOLE SODIUM 40 MG/1
TABLET, DELAYED RELEASE ORAL
Qty: 30 TABLET | Refills: 0 | Status: SHIPPED | OUTPATIENT
Start: 2024-08-23

## 2024-08-23 RX ORDER — SILDENAFIL 100 MG/1
TABLET, FILM COATED ORAL
Qty: 8 TABLET | Refills: 0 | Status: SHIPPED | OUTPATIENT
Start: 2024-08-23

## 2024-09-25 DIAGNOSIS — K21.9 GASTROESOPHAGEAL REFLUX DISEASE WITHOUT ESOPHAGITIS: ICD-10-CM

## 2024-10-03 ENCOUNTER — APPOINTMENT (OUTPATIENT)
Dept: PRIMARY CARE | Facility: CLINIC | Age: 74
End: 2024-10-03
Payer: MEDICARE

## 2024-10-03 VITALS
RESPIRATION RATE: 18 BRPM | DIASTOLIC BLOOD PRESSURE: 80 MMHG | HEIGHT: 72 IN | BODY MASS INDEX: 27.09 KG/M2 | WEIGHT: 200 LBS | SYSTOLIC BLOOD PRESSURE: 120 MMHG

## 2024-10-03 DIAGNOSIS — E78.5 HYPERLIPIDEMIA, UNSPECIFIED HYPERLIPIDEMIA TYPE: ICD-10-CM

## 2024-10-03 DIAGNOSIS — J43.2 CENTRILOBULAR EMPHYSEMA (MULTI): ICD-10-CM

## 2024-10-03 DIAGNOSIS — I10 ESSENTIAL HYPERTENSION: ICD-10-CM

## 2024-10-03 DIAGNOSIS — I70.0 ATHEROSCLEROSIS OF AORTA (CMS-HCC): ICD-10-CM

## 2024-10-03 DIAGNOSIS — N18.31 CHRONIC KIDNEY DISEASE, STAGE 3A (MULTI): ICD-10-CM

## 2024-10-03 DIAGNOSIS — C49.9 SARCOMA (MULTI): ICD-10-CM

## 2024-10-03 DIAGNOSIS — E55.9 VITAMIN D DEFICIENCY: ICD-10-CM

## 2024-10-03 DIAGNOSIS — Z12.5 SCREENING PSA (PROSTATE SPECIFIC ANTIGEN): ICD-10-CM

## 2024-10-03 DIAGNOSIS — Z00.00 MEDICARE ANNUAL WELLNESS VISIT, SUBSEQUENT: Primary | ICD-10-CM

## 2024-10-03 DIAGNOSIS — M19.019 PRIMARY LOCALIZED OSTEOARTHROSIS OF SHOULDER REGION, UNSPECIFIED LATERALITY: ICD-10-CM

## 2024-10-03 DIAGNOSIS — K21.9 GASTROESOPHAGEAL REFLUX DISEASE WITHOUT ESOPHAGITIS: ICD-10-CM

## 2024-10-03 RX ORDER — PANTOPRAZOLE SODIUM 40 MG/1
40 TABLET, DELAYED RELEASE ORAL
Qty: 90 TABLET | Refills: 2 | Status: SHIPPED | OUTPATIENT
Start: 2024-10-03

## 2024-10-03 NOTE — PROGRESS NOTES
Subjective   Patient ID: Cristiano Silva is a 74 y.o. male who presents for Hypertension and Hyperlipidemia.  [facility]    HTN follow up and Medicare wellness  Denies chest pain,SOB, swelling, headaches, lightheadedness or dizziness.   Eats a generally healthy diet, Exercises.   Does not check BP at home.   Medications working well.     Taking current medications which were reviewed.  Problem list discussed.    Overall doing well.  Eating okay.  Staying active.  Advance care planning discussed  Has no other new problem /question.     ROS  Constitutional- No activity change. No appetite change.  Eyes- Denies vision changes.  Respiratory- No shortness of breath.  Cardiovascular- No palpitations. No chest pain.  GI- No nausea or vomiting. No diarrhea or constipation. Denies abdominal pain.  Musculoskeletal- Denies joint swelling.  Extremities- No edema.  Neurological- Denies headaches. Denies dizziness.  Skin- No rashes.  Psychiatric/Behavioral- Denies significant anxiety, or depressed mood.     Objective     /80   Resp 18   Ht 1.829 m (6')   Wt 90.7 kg (200 lb)   BMI 27.12 kg/m²     Allergies   Allergen Reactions    Statins-Hmg-Coa Reductase Inhibitors Other and Myalgia       Constitutional-- Well-nourished.  No distress  Head- unremarkable.  Eyes- PERRL.  Conjunctiva normal.  Nose- Normal.  No rhinorrhea noted.  Throat- Oropharynx is clear and moist.  Neck- Supple with no thyromegaly.  No significant cervical adenopathy noted.  Pulmonary/Chest- Breath sounds normal with normal effort.  No wheezing.  Heart- Regular rate and rhythm.  No murmur.  Abdomen- Soft and non-tender.  No masses noted.  Musculoskeletal- Normal ROM.  No significant joint swelling  Extremities- No edema.   Neurological- Alert.  No noted deficits.  Skin- Warm.  No rashes.  Psychiatric/Behavioral- Mood and affect normal.  Behavior normal.     Assessment/Plan   1. Medicare annual wellness visit, subsequent        2. Essential hypertension  Basic  Metabolic Panel      3. Hyperlipidemia, unspecified hyperlipidemia type  Lipid Panel      4. Vitamin D deficiency        5. Gastroesophageal reflux disease without esophagitis  pantoprazole (ProtoNix) 40 mg EC tablet      6. Chronic kidney disease, stage 3a (Multi)        7. Sarcoma (Multi)        8. Centrilobular emphysema (Multi)        9. Atherosclerosis of aorta (CMS-HCC)        10. Screening PSA (prostate specific antigen)  Prostate Specific Antigen, Screen      11. Primary localized osteoarthrosis of shoulder region, unspecified laterality               Long talk. Treatment options reviewed.  Medicare wellness questionnaire reviewed.  Home safety issues discussed.  Advance care planning reviewed    Reviewed most recent lab work with patient. Advised patient to remain up to date on routine maintenance and health screening.  Maintain appointments with specialists as scheduled.  Advised patient to remain up to date on immunizations.     Discussed hypertension. Will continue to monitor.     Discussed Chronic kidney disease. Will continue to monitor renal function.   COPD stable    Discussed importance of natural sources of nutrition.  Advised patient to consume vegetables, salads, fruits, nuts, and proteins such as fish and chicken.  Discussed portion control.      Discussed the importance of routine stretching and exercise.     Complete blood work as discussed. Advised patient to remain properly hydrated.     Continue and take your medications as prescribed.    Health Maintenance issues discussed.    We will contact you with any test results ordered. If you do not hear from us, please contact.    Follow-up as instructed or sooner if any problems or symptoms do not resolve as expected.      Scribe Attestation  By signing my name below, Kyara ROSSI Scribe   attest that this documentation has been prepared under the direction and in the presence of Tez Bates MD.

## 2024-10-12 RX ORDER — PANTOPRAZOLE SODIUM 40 MG/1
40 TABLET, DELAYED RELEASE ORAL DAILY
Qty: 30 TABLET | Refills: 0 | OUTPATIENT
Start: 2024-10-12

## 2024-10-17 DIAGNOSIS — N52.9 ERECTILE DYSFUNCTION, UNSPECIFIED ERECTILE DYSFUNCTION TYPE: ICD-10-CM

## 2024-10-17 RX ORDER — SILDENAFIL 100 MG/1
TABLET, FILM COATED ORAL
Qty: 8 TABLET | Refills: 5 | Status: SHIPPED | OUTPATIENT
Start: 2024-10-17

## 2025-02-06 ENCOUNTER — APPOINTMENT (OUTPATIENT)
Dept: UROLOGY | Facility: CLINIC | Age: 75
End: 2025-02-06
Payer: MEDICARE

## 2025-02-06 VITALS — DIASTOLIC BLOOD PRESSURE: 100 MMHG | TEMPERATURE: 97 F | SYSTOLIC BLOOD PRESSURE: 155 MMHG | HEART RATE: 87 BPM

## 2025-02-06 DIAGNOSIS — R97.20 ELEVATED PSA: ICD-10-CM

## 2025-02-06 DIAGNOSIS — R39.12 BENIGN PROSTATIC HYPERPLASIA WITH WEAK URINARY STREAM: ICD-10-CM

## 2025-02-06 DIAGNOSIS — R97.20 HIGH PROSTATE SPECIFIC ANTIGEN (PSA): Primary | ICD-10-CM

## 2025-02-06 DIAGNOSIS — N40.1 BENIGN PROSTATIC HYPERPLASIA WITH WEAK URINARY STREAM: ICD-10-CM

## 2025-02-06 DIAGNOSIS — N52.8 OTHER MALE ERECTILE DYSFUNCTION: ICD-10-CM

## 2025-02-06 PROCEDURE — 1160F RVW MEDS BY RX/DR IN RCRD: CPT | Performed by: UROLOGY

## 2025-02-06 PROCEDURE — 99214 OFFICE O/P EST MOD 30 MIN: CPT | Performed by: UROLOGY

## 2025-02-06 PROCEDURE — 3080F DIAST BP >= 90 MM HG: CPT | Performed by: UROLOGY

## 2025-02-06 PROCEDURE — 1159F MED LIST DOCD IN RCRD: CPT | Performed by: UROLOGY

## 2025-02-06 PROCEDURE — 3077F SYST BP >= 140 MM HG: CPT | Performed by: UROLOGY

## 2025-02-06 RX ORDER — TADALAFIL 20 MG/1
20 TABLET ORAL DAILY PRN
Qty: 30 TABLET | Refills: 5 | Status: SHIPPED | OUTPATIENT
Start: 2025-02-06 | End: 2026-02-06

## 2025-02-06 ASSESSMENT — ENCOUNTER SYMPTOMS
OCCASIONAL FEELINGS OF UNSTEADINESS: 0
DEPRESSION: 0
LOSS OF SENSATION IN FEET: 0

## 2025-02-06 ASSESSMENT — PATIENT HEALTH QUESTIONNAIRE - PHQ9
1. LITTLE INTEREST OR PLEASURE IN DOING THINGS: NOT AT ALL
SUM OF ALL RESPONSES TO PHQ9 QUESTIONS 1 AND 2: 0
2. FEELING DOWN, DEPRESSED OR HOPELESS: NOT AT ALL

## 2025-02-06 NOTE — PROGRESS NOTES
PAST UROLOGICAL HISTORY:  History of elevated PSA with negative prostate biopsy in 2020. PSA values have ranged from 7.5 in 04/2022 to 5.39 in 05/2023. MRI prostate in 06/2022 showed 76.1g prostate with PSAD 0.1, no concerning lesions. Previously tried tamsulosin for LUTS without benefit. Prior ED managed with sildenafil 100mg with sufficient penetration but poor durability.    HPI TODAY 02/06/2025:    #Lower Urinary Tract Symptoms:  - Continues with hesitancy, weak stream, straining  - Reports having to stand directly over toilet due to weak stream  - Denies intermittency, hematuria, urge/frequency/leakage  - NTF 1x  - Previously tried restarting tamsulosin without benefit  - Not significantly bothered by symptoms    Erectile Dysfunction:  - Currently using tadalafil 20mg and sildenafil as needed (not simultaneously)  - Reports tadalafil is primary medication  - Adequate response but notes decreased libido  - Reports adequate erections once stimulated but difficulty initiating    PSA History:  - 04/2022: 7.5  - 12/2021: 7.30  - 05/2023: 5.39  - PSA Density: 0.1    PAST MEDICAL HISTORY:  - Atrial fibrillation on Pradaxa  - Hypertension  - Hyperlipidemia  - History of smoking  - History of LLE sarcoma s/p surgery and XRT with [facility]  - Recent melanoma surgery with skin graft (09/2024)    SOCIAL:  - Retired from chemical work, metal finishing  - Lives in [facility]    REVIEW OF SYSTEMS:  A tailored review of systems was performed and all pertinent positives and negatives are listed in the HPI.    PHYSICAL EXAMINATION:  Gen: NAD  Pulm: No increased WOB on RA  Cards: WWP    ASSESSMENT/PLAN:    #Elevated PSA (R97.2):  - Assessment: Stable PSA with prior negative biopsy, non-concerning MRI  - Plan: Due for updated PSA, already ordered by PCP    #Lower Urinary Tract Symptoms (N40.1):  - Assessment: Stable symptoms, previously failed trial of tamsulosin  - Plan: Continue observation as symptoms not significantly bothersome.  Discussed option of finasteride if symptoms worsen, counseled on potential sexual side effects    #Erectile Dysfunction (N52.9):  - Assessment: Partial response to PDE5 inhibitors with new complaint of decreased libido  - Plan: Check morning testosterone level. Continue current PDE5 inhibitors  - Counseling: Discussed importance of fasting morning testosterone draw around 8AM    The patient provided verbal consent for the audio recording of today's encounter using AI.

## 2025-02-13 DIAGNOSIS — N52.9 ERECTILE DYSFUNCTION, UNSPECIFIED ERECTILE DYSFUNCTION TYPE: ICD-10-CM

## 2025-02-13 RX ORDER — SILDENAFIL 100 MG/1
TABLET, FILM COATED ORAL
Qty: 30 TABLET | Refills: 5 | Status: SHIPPED | OUTPATIENT
Start: 2025-02-13

## 2025-02-27 LAB
ANION GAP SERPL CALCULATED.4IONS-SCNC: 11 MMOL/L (CALC) (ref 7–17)
BUN SERPL-MCNC: 22 MG/DL (ref 7–25)
BUN/CREAT SERPL: ABNORMAL (CALC) (ref 6–22)
CALCIUM SERPL-MCNC: 8.9 MG/DL (ref 8.6–10.3)
CHLORIDE SERPL-SCNC: 106 MMOL/L (ref 98–110)
CHOLEST SERPL-MCNC: 182 MG/DL
CHOLEST/HDLC SERPL: 5.2 (CALC)
CO2 SERPL-SCNC: 27 MMOL/L (ref 20–32)
CREAT SERPL-MCNC: 1.28 MG/DL (ref 0.7–1.28)
EGFRCR SERPLBLD CKD-EPI 2021: 59 ML/MIN/1.73M2
GLUCOSE SERPL-MCNC: 89 MG/DL (ref 65–99)
HDLC SERPL-MCNC: 35 MG/DL
LDLC SERPL CALC-MCNC: 131 MG/DL (CALC)
NONHDLC SERPL-MCNC: 147 MG/DL (CALC)
POTASSIUM SERPL-SCNC: 4.9 MMOL/L (ref 3.5–5.3)
SODIUM SERPL-SCNC: 144 MMOL/L (ref 135–146)
TRIGL SERPL-MCNC: 68 MG/DL

## 2025-02-28 LAB
PSA SERPL-MCNC: 7.13 NG/ML
TESTOST SERPL-MCNC: 376 NG/DL (ref 250–827)

## 2025-03-04 ENCOUNTER — TELEPHONE (OUTPATIENT)
Dept: PRIMARY CARE | Facility: CLINIC | Age: 75
End: 2025-03-04
Payer: MEDICARE

## 2025-03-04 NOTE — TELEPHONE ENCOUNTER
----- Message from Tez Bates sent at 3/3/2025  7:45 PM EST -----  Labs are within normal limits or stable with your PSA continuing to be elevated at 7.1 but this is the same as it was last year but a little higher than the last time.  Continue to follow-up with urology.  Follow-up with me in about 6 months.  Please let him know

## 2025-03-07 ENCOUNTER — TELEPHONE (OUTPATIENT)
Dept: UROLOGY | Facility: CLINIC | Age: 75
End: 2025-03-07
Payer: MEDICARE

## 2025-03-07 NOTE — TELEPHONE ENCOUNTER
----- Message from Yobani Roblero sent at 3/7/2025 12:48 PM EST -----  Please let him know his testosterone levels were normal

## 2025-04-28 DIAGNOSIS — I50.20 HFREF (HEART FAILURE WITH REDUCED EJECTION FRACTION): Primary | ICD-10-CM

## 2025-04-28 RX ORDER — SPIRONOLACTONE 25 MG/1
TABLET ORAL
Qty: 18 TABLET | Refills: 3 | Status: SHIPPED | OUTPATIENT
Start: 2025-04-28

## 2025-05-08 DIAGNOSIS — I50.22 HEART FAILURE WITH MID-RANGE EJECTION FRACTION: Primary | ICD-10-CM

## 2025-05-08 DIAGNOSIS — I10 ESSENTIAL HYPERTENSION: ICD-10-CM

## 2025-05-08 RX ORDER — LISINOPRIL 10 MG/1
10 TABLET ORAL DAILY
Qty: 90 TABLET | Refills: 3 | Status: SHIPPED | OUTPATIENT
Start: 2025-05-08

## 2025-05-22 ENCOUNTER — APPOINTMENT (OUTPATIENT)
Dept: CARDIOLOGY | Facility: CLINIC | Age: 75
End: 2025-05-22
Payer: MEDICARE

## 2025-06-03 DIAGNOSIS — I48.20 CHRONIC ATRIAL FIBRILLATION (MULTI): ICD-10-CM

## 2025-06-04 RX ORDER — DABIGATRAN ETEXILATE 150 MG/1
150 CAPSULE ORAL 2 TIMES DAILY
Qty: 180 CAPSULE | Refills: 3 | Status: SHIPPED | OUTPATIENT
Start: 2025-06-04 | End: 2026-06-04

## 2025-06-09 ENCOUNTER — APPOINTMENT (OUTPATIENT)
Dept: CARDIOLOGY | Facility: CLINIC | Age: 75
End: 2025-06-09
Payer: MEDICARE

## 2025-06-10 ENCOUNTER — APPOINTMENT (OUTPATIENT)
Dept: CARDIOLOGY | Facility: CLINIC | Age: 75
End: 2025-06-10
Payer: MEDICARE

## 2025-06-10 VITALS
DIASTOLIC BLOOD PRESSURE: 72 MMHG | BODY MASS INDEX: 27.36 KG/M2 | SYSTOLIC BLOOD PRESSURE: 132 MMHG | WEIGHT: 202 LBS | HEIGHT: 72 IN | HEART RATE: 72 BPM

## 2025-06-10 DIAGNOSIS — I50.22 HEART FAILURE WITH MID-RANGE EJECTION FRACTION (HFMEF): ICD-10-CM

## 2025-06-10 DIAGNOSIS — F17.200 NICOTINE DEPENDENCE, UNCOMPLICATED, UNSPECIFIED NICOTINE PRODUCT TYPE: ICD-10-CM

## 2025-06-10 DIAGNOSIS — E78.49 OTHER HYPERLIPIDEMIA: Primary | ICD-10-CM

## 2025-06-10 DIAGNOSIS — I48.20 CHRONIC ATRIAL FIBRILLATION (MULTI): ICD-10-CM

## 2025-06-10 DIAGNOSIS — I10 ESSENTIAL HYPERTENSION: ICD-10-CM

## 2025-06-10 PROCEDURE — 4004F PT TOBACCO SCREEN RCVD TLK: CPT | Performed by: INTERNAL MEDICINE

## 2025-06-10 PROCEDURE — 1159F MED LIST DOCD IN RCRD: CPT | Performed by: INTERNAL MEDICINE

## 2025-06-10 PROCEDURE — 3008F BODY MASS INDEX DOCD: CPT | Performed by: INTERNAL MEDICINE

## 2025-06-10 PROCEDURE — 99214 OFFICE O/P EST MOD 30 MIN: CPT | Performed by: INTERNAL MEDICINE

## 2025-06-10 PROCEDURE — 3078F DIAST BP <80 MM HG: CPT | Performed by: INTERNAL MEDICINE

## 2025-06-10 PROCEDURE — 1160F RVW MEDS BY RX/DR IN RCRD: CPT | Performed by: INTERNAL MEDICINE

## 2025-06-10 PROCEDURE — 3075F SYST BP GE 130 - 139MM HG: CPT | Performed by: INTERNAL MEDICINE

## 2025-06-10 PROCEDURE — G2211 COMPLEX E/M VISIT ADD ON: HCPCS | Performed by: INTERNAL MEDICINE

## 2025-06-10 NOTE — PROGRESS NOTES
Chief Complaint:   Please see below.     History Of Present Illness:    Cristiano Silva is a 75 y.o. male presenting with chronic heart failure with mid range ejection fraction, chronic atrial fibrillation.    This 75-year-old hypertensive, hyperlipidemic man with a prior history of alcohol abuse and alcoholic cardiomyopathy has chronic atrial fibrillation managed with a strategy of rate control and anticoagulation. He has an ejection fraction of 40 to 45% by echocardiography done in April 2023. His most recent SPECT in August 2014 was negative for ischemia.     The patient is still smoking despite my warnings.  He still drinks 4 or 5 beers on weekend days.     The patient denies chest discomfort, dyspnea, palpitations, orthopnea, PND, syncope, and near syncope.  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, for which he follows with [facility]          Last Recorded Vitals:  Vitals:    06/10/25 1300   BP: 132/72   BP Location: Left arm   Patient Position: Sitting   Pulse: 72   Weight: 91.6 kg (202 lb)   Height: 1.829 m (6')         Past Medical History:  He has a past medical history of Chronic systolic (congestive) heart failure (02/25/2022), Encounter for general adult medical examination without abnormal findings (06/06/2022), Encounter for screening for malignant neoplasm of prostate (12/06/2021), Encounter for screening for malignant neoplasm of respiratory organs, Encounter for screening for other disorder (06/06/2022), Obesity, unspecified (11/05/2021), Personal history of malignant neoplasm of soft tissue (12/19/2019), Personal history of other benign neoplasm (12/19/2019), Personal history of other benign neoplasm, and Personal history of other diseases of the digestive system (12/19/2019).    Past Surgical History:  He has a past  surgical history that includes Other surgical history (12/19/2019); Other surgical history (02/04/2022); and Other surgical history (11/04/2021).      Social History:  He reports that he has been smoking cigarettes. He started smoking about 62 years ago. He has a 15.6 pack-year smoking history. He has never used smokeless tobacco. He reports current alcohol use. He reports that he does not currently use drugs after having used the following drugs: Marijuana.    Family History:  Family History   Problem Relation Name Age of Onset    Heart failure Father          Allergies:  Statins-hmg-coa reductase inhibitors    Outpatient Medications:  Current Outpatient Medications   Medication Instructions    cyanocobalamin (VITAMIN B-12) 1,000 mcg, Daily    dabigatran etexilate (PRADAXA) 150 mg, oral, 2 times daily    furosemide (LASIX) 20 mg, 3 times weekly    lisinopril 10 mg, oral, Daily    metoprolol succinate XL (TOPROL-XL) 50 mg, oral, Daily    metoprolol succinate XL (TOPROL-XL) 100 mg, oral, Daily    pantoprazole (PROTONIX) 40 mg, oral, Daily before breakfast, Do not crush, chew, or split.    potassium chloride CR 20 mEq ER tablet 20 mEq, 3 times daily PRN    sildenafil (Viagra) 100 mg tablet TAKE 1 TABLET BY MOUTH EVERY DAY AS NEEDED 1 HOUR BEFORE THE INTENDED SEX. NEEDS APPOINTMENT FOR REFILLS    spironolactone (Aldactone) 25 mg tablet Take 1/2 tablet every Tuesday, Thursday, and Saturday only.    tadalafil (CIALIS) 20 mg, oral, Daily PRN       Physical Exam:  GENERAL:  pleasant 75 year-old  HEENT: No xanthelasma  NECK: Supple, no palpable adenopathy or thyromegaly  CHEST: Clear to auscultation, respiratory effort unlabored  CARDIAC: Irregularly irregular, normal S1 and S2, no audible murmur, rub, gallop, carotids are brisk, PMI is not displaced  ABD: Active bowel sounds, nontender, no organomegaly, no evidence of ascites  EXT: No clubbing, cyanosis, edema, or tenderness  NEURO: Awake, alert, appropriate, speech is  fluent       Last Labs:  CBC -  Lab Results   Component Value Date    WBC 7.7 11/20/2023    HGB 15.9 11/20/2023    HCT 48.2 11/20/2023    MCV 94 11/20/2023     11/20/2023       CMP -  Lab Results   Component Value Date    CALCIUM 8.9 02/27/2025    PROT 7.0 11/20/2023    ALBUMIN 4.1 11/20/2023    AST 15 11/20/2023    ALT 12 11/20/2023    ALKPHOS 39 11/20/2023    BILITOT 0.8 11/20/2023       LIPID PANEL -   Lab Results   Component Value Date    CHOL 182 02/27/2025    TRIG 68 02/27/2025    HDL 35 (L) 02/27/2025    CHHDL 5.2 (H) 02/27/2025    LDLF 129 (H) 12/15/2022    VLDL 17 11/20/2023    NHDL 147 (H) 02/27/2025       RENAL FUNCTION PANEL -   Lab Results   Component Value Date    GLUCOSE 89 02/27/2025     02/27/2025    K 4.9 02/27/2025     02/27/2025    CO2 27 02/27/2025    ANIONGAP 11 02/27/2025    BUN 22 02/27/2025    CREATININE 1.28 02/27/2025    GFRMALE 64 12/15/2022    CALCIUM 8.9 02/27/2025    ALBUMIN 4.1 11/20/2023        Lab Results   Component Value Date    HGBA1C 5.7 (H) 05/17/2021         Lab review: I have Chemistry CMP:   Lab Results   Component Value Date    ALBUMIN 4.1 11/20/2023    CALCIUM 8.9 02/27/2025    CO2 27 02/27/2025    CREATININE 1.28 02/27/2025    GLUCOSE 89 02/27/2025    BILITOT 0.8 11/20/2023    PROT 7.0 11/20/2023    ALT 12 11/20/2023    AST 15 11/20/2023    ALKPHOS 39 11/20/2023   , Chemistry BMP   Lab Results   Component Value Date    GLUCOSE 89 02/27/2025    CALCIUM 8.9 02/27/2025    CO2 27 02/27/2025    CREATININE 1.28 02/27/2025   , CBC:  Lab Results   Component Value Date    WBC 7.7 11/20/2023    RBC 5.14 11/20/2023    HGB 15.9 11/20/2023    HCT 48.2 11/20/2023    MCV 94 11/20/2023    MCH 30.9 11/20/2023    MCHC 33.0 11/20/2023    RDW 12.4 11/20/2023    NRBC 0.0 11/20/2023   , and Lipids:   Lab Results   Component Value Date    CHOL 182 02/27/2025    HDL 35 (L) 02/27/2025    LDLCALC 131 (H) 02/27/2025    TRIG 68 02/27/2025         Assessment/Plan   Assessment &  Plan  Heart failure with mid-range ejection fraction (HFmEF)  The patient has stage B class 1 chronic  heart failure with mid range ejection fraction, and is stable on medical therapy.  Continue present medical regimen.  Discussed the importance of limiting sodium intake to 2000 mg daily, checking daily weights, and contacting us with weight gain in excess of 5 lbs in one week.    Orders:    Follow Up In Cardiology    Follow Up In Cardiology; Future    Chronic atrial fibrillation (Multi)  Chronic atrial fibrillation: stable on anticoagulation, with adequate rate control.  Continue present management.  I've reviewed the patient's recent labs.    Orders:    Follow Up In Cardiology    Follow Up In Cardiology; Future    Other hyperlipidemia  In a patient-centric manner, using shared decision making, I discussed starting a statin.  He unequivocally refuses, preferring lifestyle modification and diet.  Risk factor modification: educational materials were provided to the patient.     Orders:    Follow Up In Cardiology; Future    Essential hypertension  HTN: BP is not well controlled.   We discussed sodium restriction, lifestyle modification, and the DASH diet.  I advised the patient to log blood pressures daily, and to bring the data to the next appointment.  If home BPs are also high, will need to add, or adjust medications.    Risk factor modification: educational materials were provided to the patient.     Orders:    Follow Up In Cardiology; Future    Nicotine dependence, uncomplicated, unspecified nicotine product type  Above all else, I advised the patient to quit tobacco, or else risk certain future cardiovascular morbidity, and/or mortality.    Orders:    Follow Up In Cardiology; Future          Rubin Padilla MD

## 2025-06-10 NOTE — ASSESSMENT & PLAN NOTE
The patient has stage B class 1 chronic  heart failure with mid range ejection fraction, and is stable on medical therapy.  Continue present medical regimen.  Discussed the importance of limiting sodium intake to 2000 mg daily, checking daily weights, and contacting us with weight gain in excess of 5 lbs in one week.    Orders:    Follow Up In Cardiology    Follow Up In Cardiology; Future

## 2025-06-10 NOTE — PATIENT INSTRUCTIONS
"You need to stop smoking. As you know, smoking increases the risk of future heart attacks, strokes, cancer, and emphysema. In addition to these problems, someone who smokes a pack a day spends $2000 per year on tobacco alone. Those costs add up, so that someone who has smoked a pack a day for twenty years has spent $40,000 out of pocket on tobacco in their lifetime. To help you quit smoking, you should call the [place] [facility]uit Line at 9-016-QUIT-NOW. They will have other tips to help you quit. Also, there are good medicines that can help you quit.  [facility] has a tobacco clinic that can guide you through the process.  Just let me know if you'd like a referral.     You should increase your intake of fresh fruits and vegetables.  Try to consume 9-12 servings per day of such foods.  You should increase your intake of deep sea fish such as salmon and tuna.  Try to get two servings per week of fish, but if you are a pregnant woman, talk to your obstetrician before increasing your fish intake.  You should increase your intake of unprocessed nuts such as walnuts or almonds.  Increase your intake of plant-based protein.  You should avoid fried foods.  Don't consume sugary or starchy foods and sugary drinks.  Avoid saturated fats.  Try not to dine at restaurants more than once per month, and don't dine at fast food places.  Try to get 7-9 hours of sleep every night.  Try to get 150 minutes per week of moderate intensity exercise (after I have cleared you to start an exercise program).  Try to maintain the appropriate weight for your height based on body mass index (BMI). Maintain your cholesterol, blood sugar, and blood pressure in the recommended respective normal ranges.  There is a wealth of information on the American Heart Association's website regarding this.  Just Google \"Life's Essential 8\" for more information.   Ask me about any of these details  if you have questions.    As your cardiologist, I will be " available to you at any time to answer any question you have concerning your heart health.  My staff, Alysia and Diana can also answer any questions you may have.  Best of luck.       It is important for us to have an accurate list of the medications, supplements, and their doses.  It is also important for us to have an accurate list of your allergies.  Please bring this information to every appointment.  This is a vital part of the quality of care you receive through all of your providers.

## 2025-06-10 NOTE — ASSESSMENT & PLAN NOTE
Chronic atrial fibrillation: stable on anticoagulation, with adequate rate control.  Continue present management.  I've reviewed the patient's recent labs.    Orders:    Follow Up In Cardiology    Follow Up In Cardiology; Future

## 2025-06-10 NOTE — ASSESSMENT & PLAN NOTE
Above all else, I advised the patient to quit tobacco, or else risk certain future cardiovascular morbidity, and/or mortality.    Orders:    Follow Up In Cardiology; Future

## 2025-06-10 NOTE — ASSESSMENT & PLAN NOTE
In a patient-centric manner, using shared decision making, I discussed starting a statin.  He unequivocally refuses, preferring lifestyle modification and diet.  Risk factor modification: educational materials were provided to the patient.     Orders:    Follow Up In Cardiology; Future

## 2025-07-11 DIAGNOSIS — K21.9 GASTROESOPHAGEAL REFLUX DISEASE WITHOUT ESOPHAGITIS: ICD-10-CM

## 2025-07-14 RX ORDER — PANTOPRAZOLE SODIUM 40 MG/1
TABLET, DELAYED RELEASE ORAL
Qty: 14 TABLET | Refills: 0 | Status: SHIPPED | OUTPATIENT
Start: 2025-07-14

## 2025-07-14 NOTE — TELEPHONE ENCOUNTER
Last seen in 10/3/2024, please call and schedule appointment.  Can send in short supply if needed, just let us know.  Thanks ladies!

## 2025-07-28 ENCOUNTER — APPOINTMENT (OUTPATIENT)
Dept: PRIMARY CARE | Facility: CLINIC | Age: 75
End: 2025-07-28
Payer: MEDICARE

## 2025-07-28 VITALS
WEIGHT: 203 LBS | SYSTOLIC BLOOD PRESSURE: 110 MMHG | TEMPERATURE: 97.3 F | OXYGEN SATURATION: 96 % | HEART RATE: 82 BPM | RESPIRATION RATE: 16 BRPM | DIASTOLIC BLOOD PRESSURE: 80 MMHG | BODY MASS INDEX: 27.5 KG/M2 | HEIGHT: 72 IN

## 2025-07-28 DIAGNOSIS — C49.9 SARCOMA (MULTI): ICD-10-CM

## 2025-07-28 DIAGNOSIS — I10 ESSENTIAL HYPERTENSION: ICD-10-CM

## 2025-07-28 DIAGNOSIS — N18.31 CHRONIC KIDNEY DISEASE, STAGE 3A (MULTI): ICD-10-CM

## 2025-07-28 DIAGNOSIS — K21.9 GASTROESOPHAGEAL REFLUX DISEASE WITHOUT ESOPHAGITIS: ICD-10-CM

## 2025-07-28 DIAGNOSIS — J43.2 CENTRILOBULAR EMPHYSEMA (MULTI): ICD-10-CM

## 2025-07-28 DIAGNOSIS — M19.019 PRIMARY LOCALIZED OSTEOARTHROSIS OF SHOULDER REGION, UNSPECIFIED LATERALITY: Primary | ICD-10-CM

## 2025-07-28 PROCEDURE — G2211 COMPLEX E/M VISIT ADD ON: HCPCS | Performed by: FAMILY MEDICINE

## 2025-07-28 PROCEDURE — 1124F ACP DISCUSS-NO DSCNMKR DOCD: CPT | Performed by: FAMILY MEDICINE

## 2025-07-28 PROCEDURE — 1160F RVW MEDS BY RX/DR IN RCRD: CPT | Performed by: FAMILY MEDICINE

## 2025-07-28 PROCEDURE — 3074F SYST BP LT 130 MM HG: CPT | Performed by: FAMILY MEDICINE

## 2025-07-28 PROCEDURE — 99213 OFFICE O/P EST LOW 20 MIN: CPT | Performed by: FAMILY MEDICINE

## 2025-07-28 PROCEDURE — 3008F BODY MASS INDEX DOCD: CPT | Performed by: FAMILY MEDICINE

## 2025-07-28 PROCEDURE — 1159F MED LIST DOCD IN RCRD: CPT | Performed by: FAMILY MEDICINE

## 2025-07-28 PROCEDURE — 3079F DIAST BP 80-89 MM HG: CPT | Performed by: FAMILY MEDICINE

## 2025-07-28 RX ORDER — PANTOPRAZOLE SODIUM 40 MG/1
40 TABLET, DELAYED RELEASE ORAL
Qty: 90 TABLET | Refills: 1 | Status: SHIPPED | OUTPATIENT
Start: 2025-07-28

## 2025-07-28 ASSESSMENT — PATIENT HEALTH QUESTIONNAIRE - PHQ9
SUM OF ALL RESPONSES TO PHQ9 QUESTIONS 1 AND 2: 0
2. FEELING DOWN, DEPRESSED OR HOPELESS: NOT AT ALL
1. LITTLE INTEREST OR PLEASURE IN DOING THINGS: NOT AT ALL
1. LITTLE INTEREST OR PLEASURE IN DOING THINGS: NOT AT ALL
SUM OF ALL RESPONSES TO PHQ9 QUESTIONS 1 AND 2: 0
2. FEELING DOWN, DEPRESSED OR HOPELESS: NOT AT ALL

## 2025-07-28 ASSESSMENT — ENCOUNTER SYMPTOMS
OCCASIONAL FEELINGS OF UNSTEADINESS: 0
DEPRESSION: 0
LOSS OF SENSATION IN FEET: 0

## 2025-07-28 NOTE — PROGRESS NOTES
Subjective   Patient ID: Cristiano Silva is a 75 y.o. male who presents for Med Refill, Hyperlipidemia, and GERD.  HPI    Presents today for follow-up hypertension and GERD  Patient has no complaints.    Taking current medications which were reviewed.  Problem list discussed.    Overall doing well.  Eating okay.  Staying active.    Has no other new problem /question.     ROS  Constitutional- No activity change. No appetite change.  Eyes- Denies vision changes.  Respiratory- No shortness of breath.  Cardiovascular- No palpitations. No chest pain.  GI- No nausea or vomiting. No diarrhea or constipation. Denies abdominal pain.  Musculoskeletal- Denies joint swelling.  Neurological- Denies headaches. Denies dizziness.  Skin- No rashes.  Psychiatric/Behavioral- Denies significant anxiety, or depressed mood.     Objective     /80   Pulse 82   Temp 36.3 °C (97.3 °F)   Resp 16   Ht 1.829 m (6')   Wt 92.1 kg (203 lb)   SpO2 96%   BMI 27.53 kg/m²     Allergies[1]    Constitutional-- Well-nourished.  No distress  Head- unremarkable.  Eyes- PERRL.  Conjunctiva normal.  Nose- Normal.  No rhinorrhea noted.  Throat- Oropharynx is clear and moist.  Neck- Supple with no thyromegaly.  No significant cervical adenopathy noted.  Pulmonary/Chest- Breath sounds normal with normal effort.  No wheezing.  Heart- Regular rate and rhythm.  No murmur.  Abdomen- Soft and non-tender.  No masses noted.  Musculoskeletal- Normal ROM.  No significant joint swelling  Extremities- No edema.   Neurological- Alert.  No noted deficits.  Skin- Warm.  No rashes.  Psychiatric/Behavioral- Mood and affect normal.  Behavior normal.     Assessment/Plan   1. Primary localized osteoarthrosis of shoulder region, unspecified laterality        2. Gastroesophageal reflux disease without esophagitis  pantoprazole (ProtoNix) 40 mg EC tablet      3. Centrilobular emphysema (Multi)        4. Chronic kidney disease, stage 3a (Multi)        5. Sarcoma (Multi)         6. Essential hypertension               Long talk. Treatment options reviewed.  GERD stable.  Continue pantoprazole.  Use the least amount to control symptoms  Hypertension controlled  History of sarcoma.  Will continue to follow-up with specialist   Arthritis stable    Continue and take your medications as prescribed.    Health Maintenance issues discussed.    Importance of healthy diet and regular exercise regimen discussed.    Recent labs reviewed done by other doctors    Follow-up as instructed or sooner if any problems or symptoms do not resolve as expected.    All medical record entries made by the Scribe were at my direction and personally dictated by me.    I have reviewed the chart and agree that the record accurately reflects my personal performance of the history, physical exam, discussion, and plan.         [1]   Allergies  Allergen Reactions    Statins-Hmg-Coa Reductase Inhibitors Other and Myalgia

## 2025-07-31 DIAGNOSIS — I48.20 CHRONIC ATRIAL FIBRILLATION (MULTI): Primary | ICD-10-CM

## 2025-07-31 DIAGNOSIS — I10 ESSENTIAL HYPERTENSION: ICD-10-CM

## 2025-07-31 RX ORDER — METOPROLOL SUCCINATE 100 MG/1
100 TABLET, EXTENDED RELEASE ORAL DAILY
Qty: 90 TABLET | Refills: 3 | Status: SHIPPED | OUTPATIENT
Start: 2025-07-31

## 2026-02-05 ENCOUNTER — APPOINTMENT (OUTPATIENT)
Dept: UROLOGY | Facility: CLINIC | Age: 76
End: 2026-02-05
Payer: MEDICARE

## 2026-06-15 ENCOUNTER — APPOINTMENT (OUTPATIENT)
Dept: CARDIOLOGY | Facility: CLINIC | Age: 76
End: 2026-06-15
Payer: MEDICARE